# Patient Record
Sex: FEMALE | Race: WHITE | Employment: PART TIME | URBAN - METROPOLITAN AREA
[De-identification: names, ages, dates, MRNs, and addresses within clinical notes are randomized per-mention and may not be internally consistent; named-entity substitution may affect disease eponyms.]

---

## 2017-11-15 ENCOUNTER — APPOINTMENT (OUTPATIENT)
Dept: GENERAL RADIOLOGY | Facility: CLINIC | Age: 82
DRG: 190 | End: 2017-11-15
Attending: EMERGENCY MEDICINE
Payer: MEDICARE

## 2017-11-15 ENCOUNTER — HOSPITAL ENCOUNTER (INPATIENT)
Facility: CLINIC | Age: 82
LOS: 5 days | Discharge: HOME | DRG: 190 | End: 2017-11-20
Attending: EMERGENCY MEDICINE | Admitting: INTERNAL MEDICINE
Payer: MEDICARE

## 2017-11-15 DIAGNOSIS — J18.9 PNEUMONIA OF LEFT LOWER LOBE DUE TO INFECTIOUS ORGANISM: ICD-10-CM

## 2017-11-15 DIAGNOSIS — J96.21 ACUTE ON CHRONIC RESPIRATORY FAILURE WITH HYPOXIA AND HYPERCAPNIA (H): ICD-10-CM

## 2017-11-15 DIAGNOSIS — J44.1 COPD EXACERBATION (H): ICD-10-CM

## 2017-11-15 DIAGNOSIS — Z98.890 H/O MITRAL VALVE REPAIR: Primary | ICD-10-CM

## 2017-11-15 DIAGNOSIS — J96.22 ACUTE ON CHRONIC RESPIRATORY FAILURE WITH HYPOXIA AND HYPERCAPNIA (H): ICD-10-CM

## 2017-11-15 LAB
ALBUMIN SERPL-MCNC: 3.8 G/DL (ref 3.4–5)
ALP SERPL-CCNC: 72 U/L (ref 40–150)
ALT SERPL W P-5'-P-CCNC: 15 U/L (ref 0–50)
ANION GAP SERPL CALCULATED.3IONS-SCNC: 5 MMOL/L (ref 3–14)
AST SERPL W P-5'-P-CCNC: 24 U/L (ref 0–45)
BASE EXCESS BLDV CALC-SCNC: 11 MMOL/L
BASOPHILS # BLD AUTO: 0 10E9/L (ref 0–0.2)
BASOPHILS NFR BLD AUTO: 0.2 %
BILIRUB SERPL-MCNC: 0.6 MG/DL (ref 0.2–1.3)
BUN SERPL-MCNC: 31 MG/DL (ref 7–30)
CALCIUM SERPL-MCNC: 9.6 MG/DL (ref 8.5–10.1)
CHLORIDE SERPL-SCNC: 99 MMOL/L (ref 94–109)
CO2 SERPL-SCNC: 37 MMOL/L (ref 20–32)
CREAT SERPL-MCNC: 1.17 MG/DL (ref 0.52–1.04)
DIFFERENTIAL METHOD BLD: ABNORMAL
EOSINOPHIL # BLD AUTO: 0 10E9/L (ref 0–0.7)
EOSINOPHIL NFR BLD AUTO: 0.1 %
ERYTHROCYTE [DISTWIDTH] IN BLOOD BY AUTOMATED COUNT: 15.8 % (ref 10–15)
GFR SERPL CREATININE-BSD FRML MDRD: 44 ML/MIN/1.7M2
GLUCOSE SERPL-MCNC: 160 MG/DL (ref 70–99)
HCO3 BLDV-SCNC: 38 MMOL/L (ref 21–28)
HCT VFR BLD AUTO: 37.4 % (ref 35–47)
HGB BLD-MCNC: 10.1 G/DL (ref 11.7–15.7)
HGB BLD-MCNC: 11.9 G/DL (ref 11.7–15.7)
IMM GRANULOCYTES # BLD: 0 10E9/L (ref 0–0.4)
IMM GRANULOCYTES NFR BLD: 0.3 %
INR PPP: 3.63 (ref 0.86–1.14)
INTERPRETATION ECG - MUSE: NORMAL
LACTATE BLD-SCNC: 0.9 MMOL/L (ref 0.7–2)
LYMPHOCYTES # BLD AUTO: 0.5 10E9/L (ref 0.8–5.3)
LYMPHOCYTES NFR BLD AUTO: 5.4 %
MCH RBC QN AUTO: 31.8 PG (ref 26.5–33)
MCHC RBC AUTO-ENTMCNC: 31.8 G/DL (ref 31.5–36.5)
MCV RBC AUTO: 100 FL (ref 78–100)
MONOCYTES # BLD AUTO: 0.7 10E9/L (ref 0–1.3)
MONOCYTES NFR BLD AUTO: 7 %
NEUTROPHILS # BLD AUTO: 8.6 10E9/L (ref 1.6–8.3)
NEUTROPHILS NFR BLD AUTO: 87 %
NRBC # BLD AUTO: 0 10*3/UL
NRBC BLD AUTO-RTO: 0 /100
OXYHGB MFR BLDV: 67 %
PCO2 BLDV: 65 MM HG (ref 40–50)
PH BLDV: 7.38 PH (ref 7.32–7.43)
PLATELET # BLD AUTO: 186 10E9/L (ref 150–450)
PO2 BLDV: 38 MM HG (ref 25–47)
POTASSIUM SERPL-SCNC: 4.6 MMOL/L (ref 3.4–5.3)
PROT SERPL-MCNC: 8.1 G/DL (ref 6.8–8.8)
RBC # BLD AUTO: 3.74 10E12/L (ref 3.8–5.2)
SODIUM SERPL-SCNC: 141 MMOL/L (ref 133–144)
WBC # BLD AUTO: 9.9 10E9/L (ref 4–11)

## 2017-11-15 PROCEDURE — 94640 AIRWAY INHALATION TREATMENT: CPT

## 2017-11-15 PROCEDURE — 99285 EMERGENCY DEPT VISIT HI MDM: CPT | Mod: 25

## 2017-11-15 PROCEDURE — 99207 ZZC CDG-MDM COMPONENT: MEETS MODERATE - UP CODED: CPT | Performed by: INTERNAL MEDICINE

## 2017-11-15 PROCEDURE — 93005 ELECTROCARDIOGRAM TRACING: CPT

## 2017-11-15 PROCEDURE — 83605 ASSAY OF LACTIC ACID: CPT | Performed by: EMERGENCY MEDICINE

## 2017-11-15 PROCEDURE — 21000001 ZZH R&B HEART CARE

## 2017-11-15 PROCEDURE — 80053 COMPREHEN METABOLIC PANEL: CPT | Performed by: EMERGENCY MEDICINE

## 2017-11-15 PROCEDURE — 25000132 ZZH RX MED GY IP 250 OP 250 PS 637: Mod: GY | Performed by: INTERNAL MEDICINE

## 2017-11-15 PROCEDURE — 82805 BLOOD GASES W/O2 SATURATION: CPT | Performed by: EMERGENCY MEDICINE

## 2017-11-15 PROCEDURE — 99223 1ST HOSP IP/OBS HIGH 75: CPT | Mod: AI | Performed by: INTERNAL MEDICINE

## 2017-11-15 PROCEDURE — 85025 COMPLETE CBC W/AUTO DIFF WBC: CPT | Performed by: EMERGENCY MEDICINE

## 2017-11-15 PROCEDURE — 40000275 ZZH STATISTIC RCP TIME EA 10 MIN

## 2017-11-15 PROCEDURE — A9270 NON-COVERED ITEM OR SERVICE: HCPCS | Mod: GY | Performed by: INTERNAL MEDICINE

## 2017-11-15 PROCEDURE — 85610 PROTHROMBIN TIME: CPT | Performed by: INTERNAL MEDICINE

## 2017-11-15 PROCEDURE — 36415 COLL VENOUS BLD VENIPUNCTURE: CPT | Performed by: INTERNAL MEDICINE

## 2017-11-15 PROCEDURE — 85018 HEMOGLOBIN: CPT | Performed by: INTERNAL MEDICINE

## 2017-11-15 PROCEDURE — 94640 AIRWAY INHALATION TREATMENT: CPT | Mod: 76

## 2017-11-15 PROCEDURE — 25000125 ZZHC RX 250: Performed by: INTERNAL MEDICINE

## 2017-11-15 PROCEDURE — 71020 XR CHEST 2 VW: CPT

## 2017-11-15 RX ORDER — LIDOCAINE 40 MG/G
CREAM TOPICAL
Status: DISCONTINUED | OUTPATIENT
Start: 2017-11-15 | End: 2017-11-15

## 2017-11-15 RX ORDER — FUROSEMIDE 40 MG
80 TABLET ORAL
COMMUNITY
Start: 2017-08-10

## 2017-11-15 RX ORDER — METHYLPREDNISOLONE SODIUM SUCCINATE 125 MG/2ML
INJECTION, POWDER, LYOPHILIZED, FOR SOLUTION INTRAMUSCULAR; INTRAVENOUS
Status: DISCONTINUED
Start: 2017-11-15 | End: 2017-11-15 | Stop reason: HOSPADM

## 2017-11-15 RX ORDER — IPRATROPIUM BROMIDE AND ALBUTEROL SULFATE 2.5; .5 MG/3ML; MG/3ML
SOLUTION RESPIRATORY (INHALATION)
Status: DISCONTINUED
Start: 2017-11-15 | End: 2017-11-15 | Stop reason: HOSPADM

## 2017-11-15 RX ORDER — LIDOCAINE 40 MG/G
CREAM TOPICAL
Status: DISCONTINUED | OUTPATIENT
Start: 2017-11-15 | End: 2017-11-20 | Stop reason: HOSPADM

## 2017-11-15 RX ORDER — IPRATROPIUM BROMIDE AND ALBUTEROL SULFATE 2.5; .5 MG/3ML; MG/3ML
3 SOLUTION RESPIRATORY (INHALATION) EVERY 4 HOURS
Status: DISCONTINUED | OUTPATIENT
Start: 2017-11-15 | End: 2017-11-20 | Stop reason: HOSPADM

## 2017-11-15 RX ORDER — WARFARIN SODIUM 2 MG/1
TABLET ORAL
Status: ON HOLD | COMMUNITY
Start: 2017-08-29 | End: 2017-11-15

## 2017-11-15 RX ORDER — ACETAMINOPHEN 325 MG/1
650 TABLET ORAL EVERY 4 HOURS PRN
Status: DISCONTINUED | OUTPATIENT
Start: 2017-11-15 | End: 2017-11-20 | Stop reason: HOSPADM

## 2017-11-15 RX ORDER — AZITHROMYCIN 250 MG/1
TABLET, FILM COATED ORAL
Status: DISCONTINUED
Start: 2017-11-15 | End: 2017-11-15 | Stop reason: HOSPADM

## 2017-11-15 RX ORDER — FUROSEMIDE 40 MG
80 TABLET ORAL DAILY
Status: DISCONTINUED | OUTPATIENT
Start: 2017-11-16 | End: 2017-11-20 | Stop reason: HOSPADM

## 2017-11-15 RX ORDER — PREDNISONE 20 MG/1
40 TABLET ORAL DAILY
Status: DISCONTINUED | OUTPATIENT
Start: 2017-11-15 | End: 2017-11-18

## 2017-11-15 RX ORDER — AZITHROMYCIN 250 MG/1
250 TABLET, FILM COATED ORAL DAILY
Status: COMPLETED | OUTPATIENT
Start: 2017-11-16 | End: 2017-11-19

## 2017-11-15 RX ORDER — NALOXONE HYDROCHLORIDE 0.4 MG/ML
.1-.4 INJECTION, SOLUTION INTRAMUSCULAR; INTRAVENOUS; SUBCUTANEOUS
Status: DISCONTINUED | OUTPATIENT
Start: 2017-11-15 | End: 2017-11-20 | Stop reason: HOSPADM

## 2017-11-15 RX ORDER — PANTOPRAZOLE SODIUM 40 MG/1
40 TABLET, DELAYED RELEASE ORAL DAILY
Status: DISCONTINUED | OUTPATIENT
Start: 2017-11-16 | End: 2017-11-20 | Stop reason: HOSPADM

## 2017-11-15 RX ORDER — POTASSIUM CHLORIDE 750 MG/1
20 CAPSULE, EXTENDED RELEASE ORAL DAILY
COMMUNITY
Start: 2017-04-14

## 2017-11-15 RX ADMIN — PREDNISONE 40 MG: 20 TABLET ORAL at 11:14

## 2017-11-15 RX ADMIN — IPRATROPIUM BROMIDE AND ALBUTEROL SULFATE 3 ML: .5; 3 SOLUTION RESPIRATORY (INHALATION) at 10:24

## 2017-11-15 RX ADMIN — IPRATROPIUM BROMIDE AND ALBUTEROL SULFATE 3 ML: .5; 3 SOLUTION RESPIRATORY (INHALATION) at 23:37

## 2017-11-15 RX ADMIN — METOPROLOL TARTRATE 75 MG: 50 TABLET ORAL at 20:41

## 2017-11-15 RX ADMIN — IPRATROPIUM BROMIDE AND ALBUTEROL SULFATE 3 ML: .5; 3 SOLUTION RESPIRATORY (INHALATION) at 17:13

## 2017-11-15 RX ADMIN — IPRATROPIUM BROMIDE AND ALBUTEROL SULFATE 3 ML: .5; 3 SOLUTION RESPIRATORY (INHALATION) at 19:08

## 2017-11-15 ASSESSMENT — ENCOUNTER SYMPTOMS
MYALGIAS: 0
SHORTNESS OF BREATH: 1
ARTHRALGIAS: 0

## 2017-11-15 NOTE — ED NOTES
Bed: ED01  Expected date: 11/15/17  Expected time:   Means of arrival:   Comments:  533 87 f/chest congestion

## 2017-11-15 NOTE — ED NOTES
[facility]  ED Nurse Handoff Report    ED Chief complaint: Shortness of Breath (history of COPD, on 4 liters NC. Congested since yesterday. )      ED Diagnosis:   Final diagnoses:   Acute on chronic respiratory failure with hypoxia and hypercapnia (H)   Pneumonia of left lower lobe due to infectious organism (H)   COPD exacerbation (H)       Code Status: DNR / DNI    Allergies:   Allergies   Allergen Reactions     Codeine Sulfate Nausea       Activity level - Baseline/Home:  Independent    Activity Level - Current:   Stand with Assist     Needed?: No    Isolation: No  Infection: Not Applicable    Bariatric?: No    Vital Signs:   Vitals:    11/15/17 0415 11/15/17 0430 11/15/17 0445 11/15/17 0500   BP: 131/67 123/55 110/68 105/56   Pulse:       Resp: 21 26 22 25   Temp:       TempSrc:       SpO2: 93% 94% 94% 94%   Weight:       Height:          *  Cardiac Rhythm: ,        Pain level:      Is this patient confused?: No    Patient Report: Initial Complaint: Patient presented with acute onset of shortness of breath that started this evening.  Patient states she hasn't been around anyone who is ill, patient denies fevers/chills.  Patient states this evening she started feeling congestion in her lungs and work of breathing increased with any exertion.  Patient denies pain in chest. Patient states she had one bowel movement that was loose before coming in.  Patient was treated for lyme disease after tick was removed from ear a couple of weeks ago.  Patient has hx of lung ca, Right lower lobe lobectomy, CHF and COPD.    Focused Assessment: Patient is alert and oriented x4, work of breathing increased with retractions and 2-3 word dyspnea on arrival.  Breath sounds initially were diffuse course crackles with expiratory wheezes on right. Patient received duoneb x 2, 125mg Solumedrol and lung sounds are crackles on left side predominantly with fine crackles on right.    Tests Performed: Chest xray,  labs  Abnormal Results: Results for JESSIE EASTMAN (MRN [id number]) as of 11/15/2017 05:11   Ref. Range 11/15/2017 01:33 11/15/2017 01:54   Sodium Latest Ref Range: 133 - 144 mmol/L 141    Potassium Latest Ref Range: 3.4 - 5.3 mmol/L 4.6    Chloride Latest Ref Range: 94 - 109 mmol/L 99    Carbon Dioxide Latest Ref Range: 20 - 32 mmol/L 37 (H)    Urea Nitrogen Latest Ref Range: 7 - 30 mg/dL 31 (H)    Creatinine Latest Ref Range: 0.52 - 1.04 mg/dL 1.17 (H)    GFR Estimate Latest Ref Range: >60 mL/min/1.7m2 44 (L)    GFR Estimate If Black Latest Ref Range: >60 mL/min/1.7m2 53 (L)    Calcium Latest Ref Range: 8.5 - 10.1 mg/dL 9.6    Anion Gap Latest Ref Range: 3 - 14 mmol/L 5    Albumin Latest Ref Range: 3.4 - 5.0 g/dL 3.8    Protein Total Latest Ref Range: 6.8 - 8.8 g/dL 8.1    Bilirubin Total Latest Ref Range: 0.2 - 1.3 mg/dL 0.6    Alkaline Phosphatase Latest Ref Range: 40 - 150 U/L 72    ALT Latest Ref Range: 0 - 50 U/L 15    AST Latest Ref Range: 0 - 45 U/L 24    Lactic Acid Latest Ref Range: 0.7 - 2.0 mmol/L 0.9    Glucose Latest Ref Range: 70 - 99 mg/dL 160 (H)    Ph Venous Latest Ref Range: 7.32 - 7.43 pH  7.38   PCO2 Venous Latest Ref Range: 40 - 50 mm Hg  65 (H)   PO2 Venous Latest Ref Range: 25 - 47 mm Hg  38   Bicarbonate Venous Latest Ref Range: 21 - 28 mmol/L  38 (H)   Base Excess Venous Latest Units: mmol/L  11.0   Oxyhemoglobin Venous Latest Units: %  67   WBC Latest Ref Range: 4.0 - 11.0 10e9/L 9.9    Hemoglobin Latest Ref Range: 11.7 - 15.7 g/dL 11.9    Hematocrit Latest Ref Range: 35.0 - 47.0 % 37.4    Platelet Count Latest Ref Range: 150 - 450 10e9/L 186    RBC Count Latest Ref Range: 3.8 - 5.2 10e12/L 3.74 (L)    MCV Latest Ref Range: 78 - 100 fl 100    MCH Latest Ref Range: 26.5 - 33.0 pg 31.8    MCHC Latest Ref Range: 31.5 - 36.5 g/dL 31.8    RDW Latest Ref Range: 10.0 - 15.0 % 15.8 (H)    Diff Method Unknown Automated Method    % Neutrophils Latest Units: % 87.0    % Lymphocytes Latest  Units: % 5.4    % Monocytes Latest Units: % 7.0    % Eosinophils Latest Units: % 0.1    % Basophils Latest Units: % 0.2    % Immature Granulocytes Latest Units: % 0.3    Nucleated RBCs Latest Ref Range: 0 /100 0    Absolute Neutrophil Latest Ref Range: 1.6 - 8.3 10e9/L 8.6 (H)    Absolute Lymphocytes Latest Ref Range: 0.8 - 5.3 10e9/L 0.5 (L)    Absolute Monocytes Latest Ref Range: 0.0 - 1.3 10e9/L 0.7    Absolute Eosinophils Latest Ref Range: 0.0 - 0.7 10e9/L 0.0    Absolute Basophils Latest Ref Range: 0.0 - 0.2 10e9/L 0.0    Abs Immature Granulocytes Latest Ref Range: 0 - 0.4 10e9/L 0.0    Absolute Nucleated RBC Unknown 0.0      Treatments provided: Duoneb x 2- Improved work of breathing, decreased respiratory rate, improved breath sounds  125mg Solumedrol, 500mg Oral Zithromax, 1G Rocephin IV.     Family Comments: At bedside    OBS brochure/video discussed/provided to patient: No    ED Medications:   Medications   sodium chloride (PF) 0.9% PF flush 3 mL (not administered)   lidocaine 1 % 1 mL (not administered)   lidocaine (LMX4) cream (not administered)   sodium chloride (PF) 0.9% PF flush 3 mL (not administered)   0.9% sodium chloride BOLUS (1,000 mLs Intravenous Not Given 11/15/17 8728)   ipratropium - albuterol 0.5 mg/2.5 mg/3 mL (DUONEB) 0.5-2.5 (3) MG/3ML neb solution (not administered)   methylPREDNISolone sodium succinate (solu-MEDROL) 125 mg/2 mL injection (not administered)   ipratropium - albuterol 0.5 mg/2.5 mg/3 mL (DUONEB) 0.5-2.5 (3) MG/3ML neb solution (not administered)   azithromycin (ZITHROMAX) 250 MG tablet (not administered)   cefTRIAXone (ROCEPHIN) 1-3.74 GM-% intermittent infusion (not administered)       Drips infusing?:  No      ED NURSE PHONE NUMBER: [phone]

## 2017-11-15 NOTE — PHARMACY-ADMISSION MEDICATION HISTORY
Admission medication history interview status for the 11/15/2017  admission is complete. See EPIC admission navigator for prior to admission medications     Medication history source reliability:Good    Actions taken by pharmacist (provider contacted, etc):None     Additional medication history information not noted on PTA med list :None    Medication reconciliation/reorder completed by provider prior to medication history? Yes    Time spent in this activity: 15 min    Prior to Admission medications    Medication Sig Last Dose Taking? Auth Provider   furosemide (LASIX) 40 MG tablet Take 80 mg by mouth  Yes Reported, Patient   potassium chloride (MICRO-K) 10 MEQ CR capsule Take 20 mEq by mouth daily  11/14/2017 at Unknown time Yes Reported, Patient   WARFARIN SODIUM PO Take 4 mg by mouth daily 11/13/2017 Yes Unknown, Entered By History   VITAMIN D, CHOLECALCIFEROL, PO Take 2,000 Units by mouth daily 11/14/2017 at Unknown time Yes Unknown, Entered By History   pantoprazole (PROTONIX) 40 MG enteric coated tablet Take 1 tablet (40 mg) by mouth 2 times daily (before meals)  Patient taking differently: Take 40 mg by mouth daily  11/14/2017 at Unknown time Yes Roger Riley, DO   fluticasone-salmeterol (ADVAIR) 250-50 MCG/DOSE diskus inhaler Inhale 1 puff into the lungs every 12 hours 11/14/2017 at Unknown time Yes Reported, Patient   METOPROLOL TARTRATE PO Take 75 mg by mouth 2 times daily  11/14/2017 at Unknown time Yes Reported, Patient

## 2017-11-15 NOTE — ED PROVIDER NOTES
"  History     Chief Complaint:  Shortness of breath     HPI   Neda Dennis is a 87 year old female on Warfarin with a history of COPD, Lung cancer, and CHF with a placed pacemaker who presents with shortness of breath. The patient states that her lungs are \"full of gunk\", but that she has not been able to get rid of it with coughing. The patient denies chest pain, arthralgia or myalgia, and swelling in her legs. The last time her breathing was bad like this was the last time she had pneumonia, which was a year and a half ago. Over the course of the day her dyspnea has increased. Pt normally wears 4L NC at home for her COPD, however EMS found her to be hypoxic despite this. She denies recent fevers or URI symptoms. Denies abd pain, n/v.    Allergies:  Codeine Sulfate      Medications:    Lasix  Coumadin  Protonix  Advair  Metoprolol Tartrate     Past Medical History:    COPD  Hypertension  Lung cancer  Pacemaker   S/P MVR   Ulcer  GI Bleed  Mitral valve repair  Atrial Fibrillation  Pleural effusion  Lung nodules  Shortness of breath   Schamberg disease     Past Surgical History:    EGD Combined  Lobectomy of lung     Family History:    History reviewed. No pertinent family history.      Social History:  The patient was accompanied to the ED by EMS.  Smoking Status: Former  Alcohol Use: No   Marital Status:       Review of Systems   Respiratory: Positive for shortness of breath.    Cardiovascular: Negative for chest pain and leg swelling.   Musculoskeletal: Negative for arthralgias and myalgias.   All other systems reviewed and are negative.    Physical Exam   First Vitals:  BP: 160/82  Pulse: 102  Temp: 99.3  F (37.4  C)  Resp: 30  Height: 177.8 cm (5' 10\")  Weight: 61.2 kg (135 lb)  SpO2: 94 %      Physical Exam  General: Appears mildly uncomfortable. Nontoxic.  Head:  Scalp, face, and head appear normal  Eyes:  Pupils are equal, round, and reactive to light    No nystagmus    Conjunctivae " non-injected and sclerae white  ENT:    The external nose is normal    Pinnae are normal    The oropharynx is normal, mucous membranes moist    Uvula is in the midline  Neck:  Normal range of motion    There is no rigidity noted    Trachea is in the midline  CV:  Tachycardic rate, regular rhythm      Normal S1/S2, no S3/S4    No murmur or rub  Resp:  Lungs are equal bilaterally    Diffuse coarse rales bilaterally to mid lung fields with diffusely restricted air movement.    + tachypnea    Moderately increased WOB with retractions  GI:  Abdomen is soft, no rigidity or guarding    No distension, or mass    No tenderness   MS:  Normal muscular tone    Symmetric motor strength    No lower extremity edema  Skin:  No rash or acute skin lesions noted  Neuro:  Awake and alert    Speech is normal and fluent    Moves all extremities spontaneously  Psych: Normal affect.  Appropriate interactions.      Emergency Department Course     ECG:  Indication: Shortness of breath   Completed at 0504.  Read at 0507.   Ventricular paced rhythm with occasional premature ventriclar complexes   Abnormal ECG   Rate 65  bpm. NE interval *. QRS duration 188. QT/QTc 486/505. P-R-T axes * 107 85.    Imaging:  Radiology findings were communicated with the patient and family who voiced understanding of the findings.    XR Chest 2 views:   IMPRESSION:  1. Left basilar infiltrate, atelectasis versus pneumonia.  2. Cardiomegaly.  Report per radiology    Laboratory:  Laboratory findings were communicated with the patient and family who voiced understanding of the findings.  Blood gas venous: pH 7.38, pCO2 65 (H), pO2 38, Bicarbonate 38 (H), Oxyhemoglobin 67, Base excess 11.0  CBC: AWNL. (WBC 9.9, HGB 11.9, )  Lactic acid whole blood: 0.9  CMP: CO2 37 high, Glucose 160 high, Urea nitrogen 31 high, Creatinine 1.17 high, GFR Estimate 44 low o/w WNL     Interventions:  0213 - DuoNeb 3mL Nebulization x3  0300 - solu-medrol 125 mg IV   0337 -  Ceftriaxone 1g IV   0337 - Azithromycin 300 mg PO     Emergency Department Course:  Nursing notes and vitals reviewed.  EKG obtained in the ED, see results above.   The patient was sent for a XR Chest 2 views while in the emergency department, results above.   The patient provided a urine sample here in the emergency department. This was sent for laboratory testing, findings above.  IV was inserted and blood was drawn for laboratory testing, results above.  0145: I performed an exam of the patient as documented above.   0216: I was updated that the patient became more short of breath on canula.   0257: Patient rechecked and updated.   0342: I spoke with Dr. Ashton of the Hospitalist service regarding the patient s presentation, findings, and plan of care.  Findings and plan explained to the Patient and spouse who consents to admission. Discussed the patient with Dr. Palomo, who will admit the patient [place] a [facility] [place] for further monitoring, evaluation, and treatment.  I personally reviewed the laboratory and imaging results with the Patient and spouse and answered all related questions prior to admission.    Impression & Plan      Medical Decision Making:  Neda Dennis is a 87 year old female with a history of COPD and CHF who presents today with acute worsening of her sob and dyspnea as well as worsening hypoxia. Patient is normally on 4 L nasal cannula at home, but had an increased oxygen requirement today. DDx includes COPD exacerbation, CHF exacerbation, pneumonia, pneumothorax, pleural effusion, pulmonary embolism, acute coronary syndrome. Clinically the patient does not appear to be volume overloaded. She has no JVD, there is no peripheral edema, I feel that this makes CHF less likely. She is afebrile, but does note increasing cough which would be concerning for possible COPD exacerbation or pneumonia. Patient was treated with nebs and steroids with improvement in her work of breathing and tachypnea  although she did have persistently increased oxygen requirement. Chest x-ray revealed a possible left lower lobe infiltrate, otherwise was unchanged. Given this, Patient was started on treatment for community acquired pneumonia with ceftriaxone and azithromycin. IV fluids were held given her history of CHF. Patient will be admitted to the hospitalist or further evaluation and treatment. Patient improved while in the emergency department and remained stable at the time of her admission.     Diagnosis:    ICD-10-CM    1. Acute on chronic respiratory failure with hypoxia and hypercapnia (H) J96.21     J96.22    2. Pneumonia of left lower lobe due to infectious organism (H) J18.1    3. COPD exacerbation (H) J44.1        Disposition:  [place] to [facility]    Scribe Disclosure:  Emma DRUMMOND, am serving as a scribe at 1:45 AM on 11/15/2017 to document services personally performed by Eladio Aquino MD based on my observations and the provider's statements to me.   11/15/2017    EMERGENCY DEPARTMENT       Eladio Aquino MD  11/15/17 2008

## 2017-11-15 NOTE — IP AVS SNAPSHOT
[address], [address]    [facility] [address]    Phone:  [phone]                                       After Visit Summary   11/15/2017    Neda Dennis    MRN: [id number]           After Visit Summary Signature Page     I have received my discharge instructions, and my questions have been answered. I have discussed any challenges I see with this plan with the nurse or doctor.    ..........................................................................................................................................  Patient/Patient Representative Signature      ..........................................................................................................................................  Patient Representative Print Name and Relationship to Patient    ..................................................               ................................................  Date                                            Time    ..........................................................................................................................................  Reviewed by Signature/Title    ...................................................              ..............................................  Date                                                            Time

## 2017-11-15 NOTE — H&P
[facility]    History and Physical  Hospitalist       Date of Admission:  11/15/2017  Date of Service (when I saw the patient): 11/15/17    Assessment & Plan   Neda Dennis is a 87 year old female who presents with shortness of breath    COPD exacerbation  Pneumonia  Mrs. Dennis presented to the hospital today after having acute onset of shortness of breath.  She states she was in her normal state of health until 10 PM when she is developed acute shortness of breath.  She presented to the emergency department quite short of breath but responded well to nebulization.  At the time of my visit she was on 6 L of oxygen, which is slightly over her baseline oxygen dose.  Chest x-ray was consistent with a left lower lobe infiltrate.  - Continue treatment with ceftriaxone and azithromycin for pneumonia  - DuoNeb's q.4 hours while awake  - We'll put on short steroid course  - Continue prior to admission Advair inhaler    History of lung cancer  Patient states she's had surgery as well as radiation therapy.  States she still is a cancer but is slow growing.  Further plans for treatment.    History of mitral valve replacement  History of atrial fibrillation with pacemaker placement  Hypertension  - We'll place a warfarin consult pharmacy  - Continue Lasix and metoprolol.    DVT Prophylaxis: Warfarin  Code Status: DNR/ DNI    Disposition: Expected discharge in couple of days once her breathing is improved.    Horacio Ashton MD  [phone] (P)  Text Page     Primary Care Physician   Dr. Pollo Leiva (Inactive)    Chief Complaint   Shortness of breath    History is obtained from the patient and medical records    History of Present Illness   Neda Dennis is a 87 year old female who presents with acute onset of shortness of breath.  His affect was in her normal state of health until 10 PM the evening prior to admission when she developed sudden onset of shortness of breath.  Denies  any chest pain fevers chills or sweats.  On arrival to the emergency department she was quite dyspneic and had multiple nebs.  And she responded quite well and her oxygen needs decreased.  She may visit she is actually doing well.  Her breathing was nonlabored.  She denied any chest pain, nausea or vomiting, fevers or chills.  She states prior to last night she was doing fine.    Past Medical History    I have reviewed this patient's medical history and updated it with pertinent information if needed.   Past Medical History:   Diagnosis Date     COPD (chronic obstructive pulmonary disease) (H)      HTN (hypertension)      Lung cancer (H)      Lung cancer (H)      Pacemaker      S/P MVR (mitral valve repair)      Ulcer (H)        Past Surgical History   I have reviewed this patient's surgical history and updated it with pertinent information if needed.  Past Surgical History:   Procedure Laterality Date     ESOPHAGOSCOPY, GASTROSCOPY, DUODENOSCOPY (EGD), COMBINED  9/4/2013    Procedure: COMBINED ESOPHAGOSCOPY, GASTROSCOPY, DUODENOSCOPY (EGD), BIOPSY SINGLE OR MULTIPLE;  gastroscopy;  Surgeon: Sharan Vazquez MD;  Location: [facility]     LOBECTOMY LUNG         Prior to Admission Medications   Prior to Admission Medications   Prescriptions Last Dose Informant Patient Reported? Taking?   METOPROLOL TARTRATE PO  Self Yes No   Sig: Take 75 mg by mouth 2 times daily    Omega-3 Fatty Acids (OMEGA-3 FISH OIL PO)  Self Yes No   Sig: Take 1 g by mouth daily    WARFARIN SODIUM PO  Self Yes No   Sig: Take 4 mg by mouth See Admin Instructions Pt has been taking 4 mg po qPM for past month.   fluticasone-salmeterol (ADVAIR) 250-50 MCG/DOSE diskus inhaler  Self Yes No   Sig: Inhale 1 puff into the lungs every 12 hours   furosemide (LASIX) 40 MG tablet   Yes Yes   Sig: Take 80 mg by mouth   multivitamin, therapeutic with minerals (MULTI-VITAMIN) TABS  Self Yes No   Sig: Take 1 tablet by mouth daily   pantoprazole (PROTONIX) 40 MG  enteric coated tablet  Self No No   Sig: Take 1 tablet (40 mg) by mouth 2 times daily (before meals)   potassium chloride (MICRO-K) 10 MEQ CR capsule   Yes Yes   Sig: Take 20 mEq by mouth   triamterene-hydrochlorothiazide (MAXZIDE-25) 37.5-25 MG per tablet  Self Yes No   Sig: Take 1 tablet by mouth daily   warfarin (COUMADIN) 2 MG tablet   Yes Yes   Sig: Warfarin 5 mg 8/29/17, then 4 mg daily after that. Or take as directed.      Facility-Administered Medications: None     Allergies   Allergies   Allergen Reactions     Codeine Sulfate Nausea       Social History   I have reviewed this patient's social history and updated it with pertinent information if needed. Neda Dennis  reports that she has quit smoking. She does not have any smokeless tobacco history on file. She reports that she does not drink alcohol or use illicit drugs.    Family History   I have reviewed this patient's family history and updated it with pertinent information if needed.   No family history on file.    Review of Systems   The 10 point Review of Systems is negative other than noted in the HPI or here.    Physical Exam   Temp: 99.3  F (37.4  C) Temp src: Oral BP: 107/54 Pulse: 102 Heart Rate: 71 Resp: 22 SpO2: 92 % O2 Device: Nasal cannula Oxygen Delivery: 8 LPM  Vital Signs with Ranges  136 lbs 12.8 oz    Constitutional: alert, oriented and in no acute distress  Eyes: EOMI, PERRL  HEENT: OP clear  Respiratory: coarse but clear to some degree with cough. LLL coarse BS.   Cardiovascular: RRR without m/r/g  GI: soft, nontender, nondistended, no HSM  Lymph/Hematologic: no cervical LAD  Genitourinary: deferred  Skin: no rashes or lesions grossly  Musculoskeletal: no deformities or arthritis  Neurologic: CN II-XII, MARI, sensation grossly intact  Psychiatric: mood and affect wnl    Data   Data reviewed today:  I personally reviewed the chest x-ray image(s) showing Left basal infiltrate.    Recent Labs  Lab 11/15/17  0133   WBC 9.9   HGB  11.9            POTASSIUM 4.6   CHLORIDE 99   CO2 37*   BUN 31*   CR 1.17*   ANIONGAP 5   JENNIE 9.6   *   ALBUMIN 3.8   PROTTOTAL 8.1   BILITOTAL 0.6   ALKPHOS 72   ALT 15   AST 24       Recent Results (from the past 24 hour(s))   XR Chest 2 Views    Narrative    XR CHEST 2 VW  11/15/2017 1:57 AM      HISTORY: Cough, COPD, CHF, congested lungs.      COMPARISON: 5/3/2014.    FINDINGS: Sternal wires and a left subclavian cardiac device are in  place. No pneumothorax. The heart is enlarged without pulmonary edema.  The thoracic aorta is calcified and tortuous. There is new left  basilar infiltrate. Curvilinear scarring in the right midlung.  Probable scarring at the right lung base. There may be a left pleural  effusion.      Impression    IMPRESSION:  1. Left basilar infiltrate, atelectasis versus pneumonia.  2. Cardiomegaly.    GULSHAN REYEZ MD

## 2017-11-15 NOTE — DOWNTIME EVENT NOTE
The EMR was down for 2.75 hours on 11/15/2017.    Erin Ozuna was responsible for completing the paper charting during this time period.     The following information was re-entered into the system by Erin Ozuna: Height/weight, Allergies, Problem list and Flowsheet data    The following information will remain in the paper chart: Narrative Summary and MAR    Erin Ozuna  11/15/2017

## 2017-11-15 NOTE — PLAN OF CARE
Problem: Pneumonia (Adult)  Goal: Signs and Symptoms of Listed Potential Problems Will be Absent, Minimized or Managed (Pneumonia)  Signs and symptoms of listed potential problems will be absent, minimized or managed by discharge/transition of care (reference Pneumonia (Adult) CPG).   Outcome: No Change  Oxygen weaned down to 6 liters oxymizer (4L at baseline), Productive cough, mostly bloody, crackles present on the left, otherwise diminished with faint wheezes. Pulmonary toilet encouraged.

## 2017-11-15 NOTE — PROGRESS NOTES
Pt transferred from ED to [facility] [place] 260-2. Accompanied by daughter and son-in-law. Pt resting comfortably pts VSS on 8L NC.

## 2017-11-15 NOTE — IP AVS SNAPSHOT
MRN:[id number]                      After Visit Summary   11/15/2017    Neda Dennis    MRN: [id number]           Thank you!     Thank you for choosing [facility] for your care. Our goal is always to provide you with excellent care. Hearing back from our patients is one way we can continue to improve our services. Please take a few minutes to complete the written survey that you may receive in the mail after you visit with us. Thank you!        Patient Information     Date Of Birth          12/1/1929        Designated Caregiver       Most Recent Value    Caregiver    Will someone help with your care after discharge? yes    Name of designated caregiver Lisa [daughter]    Phone number of caregiver [phone]    Caregiver address [facility]      About your hospital stay     You were admitted on:  November 15, 2017 You last received care in the:  [place] [address] [facility]    You were discharged on:  November 20, 2017        Reason for your hospital stay       Admitted with worsening shortness of breath, most likely COPD exacerbation but also was treated for pneumonia                  Who to Call     For medical emergencies, please call 911.  For non-urgent questions about your medical care, please call your primary care provider or clinic, [phone]          Attending Provider     Provider Specialty    Eladio Aquino MD Emergency Medicine    Poli, Horacio Shafer MD Internal Medicine    [facility]       Primary Care Provider Office Phone # Fax #    Pollo WILBERTO MD [place] [phone]      After Care Instructions     Diet       Follow this diet upon discharge: Orders Placed This Encounter      Regular Diet Adult            Oxygen Adult       Renew Home Oxygen Order  Renew previous prescription.  Expected treatment length is indefinite (99 months).    Attending Provider: Miriam Martines  Physician signature: See electronic signature  associated with these discharge orders  Date of Order: November 20, 2017                  Follow-up Appointments     Follow-up and recommended labs and tests        Follow up with primary care provider, Pollo Leiva (Inactive), within 7-10  days for hospital follow- up.  The following labs/tests are recommended: CBC.    Follow-up with heart device clinic at [facility] in 2 days as scheduled  -patient to mention that hospital requested her to get an appointment with her cardiologist also to review echo report and also evaluate mild troponin elevation during her hospitalization  Pt to get INR checked this week with her [facility] coumadin clinic  -pt to make appointment with Dr. Arce from oncology to review latest CT chest report  -pt to take her 5 mg of coumadin today before discharge and start her usual dose of 4 mg daily tomorrow, 11/21/2017                  Warfarin Instruction     You have started taking a medicine called warfarin. This is a blood-thinning medicine (anticoagulant). It helps prevent and treat blood clots.      Before leaving the hospital, make sure you know how much to take and how long to take it.      You will need regular blood tests to make sure your blood is clotting safely. It is very important to see your doctor for regular blood tests.    Talk to your doctor before taking any new medicine (this includes over-the-counter drugs and herbal products). Many medicines can interact with warfarin. This may cause more bleeding or too much clotting.     Eating a lot of vitamin K--found in green, leafy vegetables--can change the way warfarin works in your body. Do NOT avoid these foods. Instead, try to eat the same amount each day.     Bleeding is the most common side-effect of warfarin. You may notice bleeding gums, a bloody nose, bruises and bleeding longer when you cut yourself. See a doctor at once if:   o You cough up blood  o You find blood in your stool (poop)  o You have a  "deep cut, or a cut that bleeds longer than 10 minutes   o You have a bad cut, hard fall, accident or hit your head (go to urgent care or the emergency room).    For women who can get pregnant: This medicine can harm an unborn baby. Be very careful not to get pregnant while taking this medicine. If you think you might be pregnant, call your doctor right away.    For more information, read \"Guide to Warfarin Therapy,  the booklet you received in the hospital.        Pending Results     Date and Time Order Name Status Description    2017 0902 EKG 12-lead, tracing only Preliminary             Statement of Approval     Ordered          17 5613  I have reviewed and agree with all the recommendations and orders detailed in this document.  EFFECTIVE NOW     Approved and electronically signed by:  Miriam Martines MD             Admission Information     Date & Time Provider Department Dept. Phone    11/15/2017 Sandra Disla MD [place] [address] [facility] [phone]      Your Vitals Were     Blood Pressure Pulse Temperature Respirations Height Weight    143/70 (BP Location: Right arm) 79 95.8  F (35.4  C) (Oral) 16 1.6 m (5' 3\") 61.8 kg (136 lb 3.2 oz)    Pulse Oximetry BMI (Body Mass Index)                98% 24.13 kg/m2          [email or web address] Information     [employer] lets you send messages to your doctor, view your test results, renew your prescriptions, schedule appointments and more. To sign up, go to [email or web address] . Click on \"Log in\" on the left side of the screen, which will take you to the Welcome page. Then click on \"Sign up Now\" on the right side of the page.     You will be asked to enter the access code listed below, as well as some personal information. Please follow the directions to create your username and password.     Your access code is: [email or web address]  Expires: 2018  4:54 PM     Your access code will  in 90 days. If you need help or a new code, please call your " [facility] or [phone].        Care EveryWhere ID     This is your Care EveryWhere ID. This could be used by other organizations to access your [facility] medical records  [id number]        Equal Access to Services     TRENT FREDERICK : Hadii aad ku hadsteven Raya, waaxda luqadaha, qaybta kaalmada diana, matthieu silva laJanelade hickey. [facility] [phone].    ATENCIÓN: Si habla español, tiene a garcia disposición servicios gratuitos de asistencia lingüística. Llame al [phone].    We comply with applicable federal civil rights laws and [place] laws. We do not discriminate on the basis of race, color, national origin, age, disability, sex, sexual orientation, or gender identity.               Review of your medicines      START taking        Dose / Directions    Ipratropium-Albuterol  MCG/ACT inhaler   Commonly known as:  COMBIVENT RESPIMAT   Used for:  Acute on chronic respiratory failure with hypoxia and hypercapnia (H)        Dose:  1 puff   Inhale 1 puff into the lungs 4 times daily as needed for shortness of breath / dyspnea or wheezing Not to exceed 6 doses per day.   Quantity:  1 Inhaler   Refills:  1         CONTINUE these medicines which may have CHANGED, or have new prescriptions. If we are uncertain of the size of tablets/capsules you have at home, strength may be listed as something that might have changed.        Dose / Directions    pantoprazole 40 MG EC tablet   Commonly known as:  PROTONIX   This may have changed:  when to take this   Used for:  Gastric ulcer        Dose:  40 mg   Take 1 tablet (40 mg) by mouth 2 times daily (before meals)   Quantity:  60 tablet   Refills:  3       warfarin 4 MG tablet   Commonly known as:  COUMADIN   This may have changed:  medication strength   Used for:  H/O mitral valve repair        Dose:  4 mg   Start taking on:  11/21/2017   Take 1 tablet (4 mg) by mouth daily   Refills:  0         CONTINUE these medicines which have NOT CHANGED         Dose / Directions    fluticasone-salmeterol 250-50 MCG/DOSE diskus inhaler   Commonly known as:  ADVAIR        Dose:  1 puff   Inhale 1 puff into the lungs every 12 hours   Refills:  0       furosemide 40 MG tablet   Commonly known as:  LASIX        Dose:  80 mg   Take 80 mg by mouth   Refills:  0       METOPROLOL TARTRATE PO        Dose:  75 mg   Take 75 mg by mouth 2 times daily   Refills:  0       potassium chloride 10 MEQ CR capsule   Commonly known as:  MICRO-K        Dose:  20 mEq   Take 20 mEq by mouth daily   Refills:  0       VITAMIN D (CHOLECALCIFEROL) PO        Dose:  2000 Units   Take 2,000 Units by mouth daily   Refills:  0            Where to get your medicines      These medications were sent to [id number] [address] [facility] [address]  [address] [address] [place] [address]     Phone:  [phone]     Ipratropium-Albuterol  MCG/ACT inhaler                Protect others around you: Learn how to safely use, store and throw away your medicines at www.disposemymeds.org.             Medication List: This is a list of all your medications and when to take them. Check marks below indicate your daily home schedule. Keep this list as a reference.      Medications           Morning Afternoon Evening Bedtime As Needed    fluticasone-salmeterol 250-50 MCG/DOSE diskus inhaler   Commonly known as:  ADVAIR   Inhale 1 puff into the lungs every 12 hours   Last time this was given:  1 puff on 11/20/2017  7:16 AM                                      furosemide 40 MG tablet   Commonly known as:  LASIX   Take 80 mg by mouth   Last time this was given:  80 mg on 11/20/2017  8:02 AM                                   Ipratropium-Albuterol  MCG/ACT inhaler   Commonly known as:  COMBIVENT RESPIMAT   Inhale 1 puff into the lungs 4 times daily as needed for shortness of breath / dyspnea or wheezing Not to exceed 6 doses per day.                                   METOPROLOL  TARTRATE PO   Take 75 mg by mouth 2 times daily   Last time this was given:  75 mg on 11/20/2017  8:03 AM                                      pantoprazole 40 MG EC tablet   Commonly known as:  PROTONIX   Take 1 tablet (40 mg) by mouth 2 times daily (before meals)   Last time this was given:  40 mg on 11/20/2017  8:03 AM                                      potassium chloride 10 MEQ CR capsule   Commonly known as:  MICRO-K   Take 20 mEq by mouth daily                                   VITAMIN D (CHOLECALCIFEROL) PO   Take 2,000 Units by mouth daily                                   warfarin 4 MG tablet   Commonly known as:  COUMADIN   Take 1 tablet (4 mg) by mouth daily   Start taking on:  11/21/2017   Last time this was given:  4 mg on 11/19/2017  5:16 PM

## 2017-11-16 LAB
ANION GAP SERPL CALCULATED.3IONS-SCNC: 2 MMOL/L (ref 3–14)
BUN SERPL-MCNC: 44 MG/DL (ref 7–30)
CALCIUM SERPL-MCNC: 9 MG/DL (ref 8.5–10.1)
CHLORIDE SERPL-SCNC: 101 MMOL/L (ref 94–109)
CO2 SERPL-SCNC: 37 MMOL/L (ref 20–32)
CREAT SERPL-MCNC: 1.36 MG/DL (ref 0.52–1.04)
ERYTHROCYTE [DISTWIDTH] IN BLOOD BY AUTOMATED COUNT: 16.2 % (ref 10–15)
GFR SERPL CREATININE-BSD FRML MDRD: 37 ML/MIN/1.7M2
GLUCOSE SERPL-MCNC: 125 MG/DL (ref 70–99)
HCT VFR BLD AUTO: 29.8 % (ref 35–47)
HGB BLD-MCNC: 9.7 G/DL (ref 11.7–15.7)
INR PPP: 5.07 (ref 0.86–1.14)
MCH RBC QN AUTO: 31.4 PG (ref 26.5–33)
MCHC RBC AUTO-ENTMCNC: 32.6 G/DL (ref 31.5–36.5)
MCV RBC AUTO: 96 FL (ref 78–100)
PLATELET # BLD AUTO: 146 10E9/L (ref 150–450)
POTASSIUM SERPL-SCNC: 4.5 MMOL/L (ref 3.4–5.3)
RBC # BLD AUTO: 3.09 10E12/L (ref 3.8–5.2)
SODIUM SERPL-SCNC: 140 MMOL/L (ref 133–144)
WBC # BLD AUTO: 10.5 10E9/L (ref 4–11)

## 2017-11-16 PROCEDURE — 25000125 ZZHC RX 250: Performed by: INTERNAL MEDICINE

## 2017-11-16 PROCEDURE — A9270 NON-COVERED ITEM OR SERVICE: HCPCS | Mod: GY | Performed by: INTERNAL MEDICINE

## 2017-11-16 PROCEDURE — 40000275 ZZH STATISTIC RCP TIME EA 10 MIN

## 2017-11-16 PROCEDURE — 85610 PROTHROMBIN TIME: CPT | Performed by: INTERNAL MEDICINE

## 2017-11-16 PROCEDURE — 94640 AIRWAY INHALATION TREATMENT: CPT | Mod: 76

## 2017-11-16 PROCEDURE — 99233 SBSQ HOSP IP/OBS HIGH 50: CPT | Performed by: INTERNAL MEDICINE

## 2017-11-16 PROCEDURE — 36415 COLL VENOUS BLD VENIPUNCTURE: CPT | Performed by: INTERNAL MEDICINE

## 2017-11-16 PROCEDURE — 25000132 ZZH RX MED GY IP 250 OP 250 PS 637: Mod: GY | Performed by: INTERNAL MEDICINE

## 2017-11-16 PROCEDURE — 80048 BASIC METABOLIC PNL TOTAL CA: CPT | Performed by: INTERNAL MEDICINE

## 2017-11-16 PROCEDURE — 25000128 H RX IP 250 OP 636: Performed by: INTERNAL MEDICINE

## 2017-11-16 PROCEDURE — 94640 AIRWAY INHALATION TREATMENT: CPT

## 2017-11-16 PROCEDURE — 12000000 ZZH R&B MED SURG/OB

## 2017-11-16 PROCEDURE — 85027 COMPLETE CBC AUTOMATED: CPT | Performed by: INTERNAL MEDICINE

## 2017-11-16 RX ORDER — ALBUTEROL SULFATE 0.83 MG/ML
2.5 SOLUTION RESPIRATORY (INHALATION)
Status: DISCONTINUED | OUTPATIENT
Start: 2017-11-16 | End: 2017-11-20 | Stop reason: HOSPADM

## 2017-11-16 RX ADMIN — IPRATROPIUM BROMIDE AND ALBUTEROL SULFATE 3 ML: .5; 3 SOLUTION RESPIRATORY (INHALATION) at 14:53

## 2017-11-16 RX ADMIN — METOPROLOL TARTRATE 75 MG: 50 TABLET ORAL at 09:06

## 2017-11-16 RX ADMIN — IPRATROPIUM BROMIDE AND ALBUTEROL SULFATE 3 ML: .5; 3 SOLUTION RESPIRATORY (INHALATION) at 10:49

## 2017-11-16 RX ADMIN — AZITHROMYCIN 250 MG: 250 TABLET, FILM COATED ORAL at 09:01

## 2017-11-16 RX ADMIN — CEFTRIAXONE 1 G: 1 INJECTION, SOLUTION INTRAVENOUS at 05:30

## 2017-11-16 RX ADMIN — IPRATROPIUM BROMIDE AND ALBUTEROL SULFATE 3 ML: .5; 3 SOLUTION RESPIRATORY (INHALATION) at 19:43

## 2017-11-16 RX ADMIN — PANTOPRAZOLE SODIUM 40 MG: 40 TABLET, DELAYED RELEASE ORAL at 09:01

## 2017-11-16 RX ADMIN — METOPROLOL TARTRATE 75 MG: 50 TABLET ORAL at 20:40

## 2017-11-16 RX ADMIN — IPRATROPIUM BROMIDE AND ALBUTEROL SULFATE 3 ML: .5; 3 SOLUTION RESPIRATORY (INHALATION) at 07:09

## 2017-11-16 RX ADMIN — PREDNISONE 40 MG: 20 TABLET ORAL at 09:01

## 2017-11-16 RX ADMIN — FUROSEMIDE 80 MG: 40 TABLET ORAL at 09:01

## 2017-11-16 NOTE — PLAN OF CARE
Problem: Patient Care Overview  Goal: Plan of Care/Patient Progress Review  Outcome: No Change  Patient is A & O times four. VSS. Denied any pain/discomfort. Oxygen at 4liters NC, sat WDL. Plan is to transfer to station 88, when a bed available. Continue to monitor.

## 2017-11-16 NOTE — PLAN OF CARE
Problem: Patient Care Overview  Goal: Plan of Care/Patient Progress Review  Pt a/o x 4. VSS, on 4 L O2 NC with humidification. Weaned back to baseline. Tele is NSR, can have occasional pacing with no spikes. Denies pain. Lungs are diminished, AGARWAL. Coughed up some red streaked sputum this shift, denies worsening from earlier this AM. Tolerating regular diet, up with SBA.

## 2017-11-16 NOTE — PROVIDER NOTIFICATION
Brief update:    Notified that INR greater than 5    Some blood in sputum, not recently noted by nursing staff    No other bleeding reported    Tolerating oral intake    Hold INR dose today, repeat INR tomorrow. No reversal at this time.    Patient with a history of mitral valve repair (vs replacement by notes). Does not appear to have a mechanical mitral valve by chest x-ray review.    Steven Shane MD  6:27 AM

## 2017-11-16 NOTE — PLAN OF CARE
Problem: Patient Care Overview  Goal: Plan of Care/Patient Progress Review  Outcome: Improving  Pt VSS on 4L NC. Tele sinus dysrhythmia in the 70s. A&Ox4. Up with SBA, using bathrom. Denies pain. Slept. Plan to continue abxs therapy. Continue to monitor.

## 2017-11-16 NOTE — PROGRESS NOTES
MD Notification    Notified Person:  MD    Notified Persons Name: Shane     Notification Date/Time: 11/16/17 6:23 AM    Notification Interaction:  Talked with Physician    Purpose of Notification: High INR at 5.07. Vit K?    Orders Received: Hold off on reversing, continue to monitor, watch for signs of bleeding, hold coumadin today.    Comments:

## 2017-11-16 NOTE — PROGRESS NOTES
[facility]    Hospitalist Progress Note    Date of Service (when I saw the patient): 11/16/2017    Assessment & Plan   Neda Dennis is a 87 year old female who was admitted on 11/15/2017. 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 Estrada.   Patient presented to the ER after acute onset shortness of breath. Admitted for further evaluation and treatment.    COPD exacerbation  Community-acquired Pneumonia, possible but felt unlikely.  Shortness of breath responded to nebulizer in emergency department. Afebrile. No elevation of WBC. Requiring 6L O2 on admission, increased from 4L baseline.   Chest x-ray noted pneumonia vs atelectasis in left lower lobe.  Started on rocephin and azithromycin for pneumonia.  Patient does not appear toxic and question if truly atelectasis. Have ordered procalcitonin for tomorrow and would consider discontinuing rocephin if unremarkable.  Continue PTA Advair. Duonebs ordered q 4 hours while awake with PRN albuterol nebs.  Prednisone 40 mg PO 11/15 and 11/16. Will keep at same dose today. Depending on improvement tomorrow, consider short burst of 40 mg dose vs taper.  Continue to wean O2 as tolerated, noting baseline is 4L O2.  Patient is down to 4L at rest today, but desats and feels dyspneic with exertion. Anticipate additional 1-2 days inpatient.    Blood-streaked sputum with elevated INR.  Known squamous cell carcinoma right upper lung, s/p radiation 2009 and followed with observation by Dr. Estrada.  Previous NSCLC right lower lobe status post wedge resection, 2008.  Later on 11/15, patient had small amount of  "blood streaked sputum with cough. No history of hemoptysis and no zhao blood or clots. INR goal 2-3, with patient noting it was elevated on last check 10 days ago, as well. INR 3.63 on admission and increased to 5.07 today, likely affected by antibiotics. Pharmacy managing. No significant bleeding and will not reverse.  Patient has follow up with Dr. Estrada in early 2018. Discussed performing CT here and decided it would not  and patient prefers \"not to know\" what is going on with cancer.      History of mitral valve repair, 2008.  History of chronic atrial fibrillation s/p pacemaker   Chronic anticoagulation with warfarin, goal 2-3.  Hypertension  Currently paced rhythm.  INR supratherapeutic outpatient per patient. Antibiotics contributing. Appreciate pharmacy assistance. No need to reverse at this time.  Continue PTA lasix and metoprolol.    Anemia  Supratherapeutic INR  Hgb 11.9 on admission. Patient has received some IV fluids with medications, but not any boluses.   Hgb has decreased to 10.1, 9.7. Small amount of blood-streaked sputum as above. Known RUL lung cancer being monitored with observation and patient declining CT scan at this time. No sign of bleeding other than small blood-streaked sputum.  Holding warfarin. Pharmacy monitoring.  Will not reverse.  Follow AM Hgb.     DVT Prophylaxis: Warfarin  Code Status: DNR/ DNI    Disposition: Expected discharge in 1-2 days depending on improvement in respiratory status.    Sandra Koenig MD    [phone] (P)  Text page (7am to 6pm)    Interval History   Patient with a few episodes of blood-streaked sputum on coughing. No zhao blood or gross clots. Weaned to 4L O2 at rest, but desats with activity. Afebrile. Otherwise no acute events.    -Data reviewed today: I reviewed all new labs and imaging results over the last 24 hours. I personally reviewed chest x-ray.    Physical Exam   Temp: 98.7  F (37.1  C) Temp src: Oral BP: 106/58   Heart " Rate: 84 Resp: 20 SpO2: 96 % O2 Device: Nasal cannula Oxygen Delivery: 4 LPM  Vitals:    11/15/17 0124 11/15/17 0603 11/16/17 0614   Weight: 61.2 kg (135 lb) 62.1 kg (136 lb 12.8 oz) 61.8 kg (136 lb 3.2 oz)     Vital Signs with Ranges  Temp:  [97.8  F (36.6  C)-98.9  F (37.2  C)] 98.7  F (37.1  C)  Heart Rate:  [68-89] 84  Resp:  [18-20] 20  BP: ()/(48-60) 106/58  SpO2:  [91 %-98 %] 96 %  I/O last 3 completed shifts:  In: 240 [P.O.:240]  Out: 400 [Urine:400]    Constitutional: Elderly woman. Alert and oriented x3. No acute distress. Sitting up with daughter at bedside. Eating breakfast.   Respiratory: Clear to auscultation bilaterally though breath sounds coarse, especially at bilateral bases. No wheezing at time of exam. On 4L NC.  Cardiovascular: Regular rate and rhythm (paced). No murmurs. No peripheral edema.  GI: Soft, nontender.  Extremities: Chronic lower extremity skin changes. No gross deformities.    Medications     Warfarin Therapy Reminder       - MEDICATION INSTRUCTIONS -         sodium chloride (PF)  3 mL Intravenous Q8H     sodium chloride 0.9%  1,000 mL Intravenous Once     ipratropium - albuterol 0.5 mg/2.5 mg/3 mL  3 mL Nebulization Q4H     azithromycin  250 mg Oral Daily     predniSONE  40 mg Oral Daily     cefTRIAXone  1 g Intravenous Q24H     furosemide  80 mg Oral Daily     metoprolol (LOPRESSOR) tablet 75 mg  75 mg Oral BID     pantoprazole  40 mg Oral Daily     fluticasone-salmeterol  1 puff Inhalation Q12H       Data     Recent Labs  Lab 11/16/17  0547 11/15/17  1635 11/15/17  0935 11/15/17  0133   WBC 10.5  --   --  9.9   HGB 9.7* 10.1*  --  11.9   MCV 96  --   --  100   *  --   --  186   INR 5.07*  --  3.63*  --      --   --  141   POTASSIUM 4.5  --   --  4.6   CHLORIDE 101  --   --  99   CO2 37*  --   --  37*   BUN 44*  --   --  31*   CR 1.36*  --   --  1.17*   ANIONGAP 2*  --   --  5   JENNIE 9.0  --   --  9.6   *  --   --  160*   ALBUMIN  --   --   --  3.8    PROTTOTAL  --   --   --  8.1   BILITOTAL  --   --   --  0.6   ALKPHOS  --   --   --  72   ALT  --   --   --  15   AST  --   --   --  24       Imaging:  No results found for this or any previous visit (from the past 24 hour(s)).

## 2017-11-17 ENCOUNTER — APPOINTMENT (OUTPATIENT)
Dept: PHYSICAL THERAPY | Facility: CLINIC | Age: 82
DRG: 190 | End: 2017-11-17
Attending: INTERNAL MEDICINE
Payer: MEDICARE

## 2017-11-17 LAB
ANION GAP SERPL CALCULATED.3IONS-SCNC: 3 MMOL/L (ref 3–14)
BUN SERPL-MCNC: 52 MG/DL (ref 7–30)
CALCIUM SERPL-MCNC: 9.3 MG/DL (ref 8.5–10.1)
CHLORIDE SERPL-SCNC: 103 MMOL/L (ref 94–109)
CO2 SERPL-SCNC: 36 MMOL/L (ref 20–32)
CREAT SERPL-MCNC: 1.32 MG/DL (ref 0.52–1.04)
ERYTHROCYTE [DISTWIDTH] IN BLOOD BY AUTOMATED COUNT: 16.6 % (ref 10–15)
GFR SERPL CREATININE-BSD FRML MDRD: 38 ML/MIN/1.7M2
GLUCOSE SERPL-MCNC: 116 MG/DL (ref 70–99)
HCT VFR BLD AUTO: 32.1 % (ref 35–47)
HGB BLD-MCNC: 10.4 G/DL (ref 11.7–15.7)
INR PPP: 2.74 (ref 0.86–1.14)
MAGNESIUM SERPL-MCNC: 2.4 MG/DL (ref 1.6–2.3)
MCH RBC QN AUTO: 31.3 PG (ref 26.5–33)
MCHC RBC AUTO-ENTMCNC: 32.4 G/DL (ref 31.5–36.5)
MCV RBC AUTO: 97 FL (ref 78–100)
PHOSPHATE SERPL-MCNC: 2.7 MG/DL (ref 2.5–4.5)
PLATELET # BLD AUTO: 165 10E9/L (ref 150–450)
POTASSIUM SERPL-SCNC: 4.1 MMOL/L (ref 3.4–5.3)
PROCALCITONIN SERPL-MCNC: 4.26 NG/ML
RBC # BLD AUTO: 3.32 10E12/L (ref 3.8–5.2)
SODIUM SERPL-SCNC: 142 MMOL/L (ref 133–144)
WBC # BLD AUTO: 11.1 10E9/L (ref 4–11)

## 2017-11-17 PROCEDURE — 85610 PROTHROMBIN TIME: CPT | Performed by: INTERNAL MEDICINE

## 2017-11-17 PROCEDURE — 97161 PT EVAL LOW COMPLEX 20 MIN: CPT | Mod: GP

## 2017-11-17 PROCEDURE — A9270 NON-COVERED ITEM OR SERVICE: HCPCS | Mod: GY | Performed by: INTERNAL MEDICINE

## 2017-11-17 PROCEDURE — 40000193 ZZH STATISTIC PT WARD VISIT

## 2017-11-17 PROCEDURE — 94640 AIRWAY INHALATION TREATMENT: CPT | Mod: 76

## 2017-11-17 PROCEDURE — 25000132 ZZH RX MED GY IP 250 OP 250 PS 637: Mod: GY | Performed by: INTERNAL MEDICINE

## 2017-11-17 PROCEDURE — 25000128 H RX IP 250 OP 636: Performed by: INTERNAL MEDICINE

## 2017-11-17 PROCEDURE — 12000000 ZZH R&B MED SURG/OB

## 2017-11-17 PROCEDURE — 83735 ASSAY OF MAGNESIUM: CPT | Performed by: INTERNAL MEDICINE

## 2017-11-17 PROCEDURE — 25000125 ZZHC RX 250: Performed by: INTERNAL MEDICINE

## 2017-11-17 PROCEDURE — 36415 COLL VENOUS BLD VENIPUNCTURE: CPT | Performed by: INTERNAL MEDICINE

## 2017-11-17 PROCEDURE — 99232 SBSQ HOSP IP/OBS MODERATE 35: CPT | Performed by: INTERNAL MEDICINE

## 2017-11-17 PROCEDURE — 40000275 ZZH STATISTIC RCP TIME EA 10 MIN

## 2017-11-17 PROCEDURE — 80048 BASIC METABOLIC PNL TOTAL CA: CPT | Performed by: INTERNAL MEDICINE

## 2017-11-17 PROCEDURE — 85027 COMPLETE CBC AUTOMATED: CPT | Performed by: INTERNAL MEDICINE

## 2017-11-17 PROCEDURE — 94640 AIRWAY INHALATION TREATMENT: CPT

## 2017-11-17 PROCEDURE — 84145 PROCALCITONIN (PCT): CPT | Performed by: INTERNAL MEDICINE

## 2017-11-17 PROCEDURE — 84100 ASSAY OF PHOSPHORUS: CPT | Performed by: INTERNAL MEDICINE

## 2017-11-17 RX ORDER — WARFARIN SODIUM 2.5 MG/1
2.5 TABLET ORAL
Status: COMPLETED | OUTPATIENT
Start: 2017-11-17 | End: 2017-11-17

## 2017-11-17 RX ADMIN — FUROSEMIDE 80 MG: 40 TABLET ORAL at 07:53

## 2017-11-17 RX ADMIN — IPRATROPIUM BROMIDE AND ALBUTEROL SULFATE 3 ML: .5; 3 SOLUTION RESPIRATORY (INHALATION) at 22:56

## 2017-11-17 RX ADMIN — METOPROLOL TARTRATE 75 MG: 50 TABLET ORAL at 07:53

## 2017-11-17 RX ADMIN — WARFARIN SODIUM 2.5 MG: 2.5 TABLET ORAL at 17:55

## 2017-11-17 RX ADMIN — CEFTRIAXONE 1 G: 1 INJECTION, SOLUTION INTRAVENOUS at 04:44

## 2017-11-17 RX ADMIN — IPRATROPIUM BROMIDE AND ALBUTEROL SULFATE 3 ML: .5; 3 SOLUTION RESPIRATORY (INHALATION) at 03:39

## 2017-11-17 RX ADMIN — IPRATROPIUM BROMIDE AND ALBUTEROL SULFATE 3 ML: .5; 3 SOLUTION RESPIRATORY (INHALATION) at 15:14

## 2017-11-17 RX ADMIN — PANTOPRAZOLE SODIUM 40 MG: 40 TABLET, DELAYED RELEASE ORAL at 07:53

## 2017-11-17 RX ADMIN — AZITHROMYCIN 250 MG: 250 TABLET, FILM COATED ORAL at 07:53

## 2017-11-17 RX ADMIN — IPRATROPIUM BROMIDE AND ALBUTEROL SULFATE 3 ML: .5; 3 SOLUTION RESPIRATORY (INHALATION) at 07:41

## 2017-11-17 RX ADMIN — IPRATROPIUM BROMIDE AND ALBUTEROL SULFATE 3 ML: .5; 3 SOLUTION RESPIRATORY (INHALATION) at 00:19

## 2017-11-17 RX ADMIN — IPRATROPIUM BROMIDE AND ALBUTEROL SULFATE 3 ML: .5; 3 SOLUTION RESPIRATORY (INHALATION) at 11:19

## 2017-11-17 RX ADMIN — METOPROLOL TARTRATE 75 MG: 50 TABLET ORAL at 21:14

## 2017-11-17 RX ADMIN — IPRATROPIUM BROMIDE AND ALBUTEROL SULFATE 3 ML: .5; 3 SOLUTION RESPIRATORY (INHALATION) at 19:14

## 2017-11-17 RX ADMIN — PREDNISONE 40 MG: 20 TABLET ORAL at 07:52

## 2017-11-17 NOTE — PLAN OF CARE
Problem: Patient Care Overview  Goal: Plan of Care/Patient Progress Review  Outcome: Improving  A &O x 4, VSS, no c/o pain. On 4L O2, baseline for pt. Lungs w/ crackles in bases. Tele: 100% V paced. PIV SL. INR 5.07, pharmacy aware and dosing. Will continue to monitor.

## 2017-11-17 NOTE — PLAN OF CARE
Problem: Patient Care Overview  Goal: Plan of Care/Patient Progress Review  Outcome: No Change  A&O, calls appropriately. Up Independent in room, SBA in hallways (ambulated x 2 with pulse ox, stayed in 90s with walking). On 4L NC (baseline). Denies SOB or pain. Plan for possible discharge tomorrow. Continue to monitor.

## 2017-11-17 NOTE — PLAN OF CARE
Problem: Patient Care Overview  Goal: Plan of Care/Patient Progress Review  Physical Therapy: Order received, chart reviewed and evaluation completed. Pt is an 87 year old female with COPD and hx of lung cancer, admitted with c/o SOB. At baseline, pt reports that she lives by herself in a home with 2 steps to enter and 10 steps to bedroom. Pt reports independence with mobility and ADLs and no use of AD PTA.     Discharge Planner PT   Patient plan for discharge: Home  Current status: Pt demonstrates ability to perform bed mobility, transfers and ambulation 150' independently. Pt ascends/descends 10 steps with handrail modified independent. Pt on 4L O2 via NC, VSS throughout activity. Will complete PT order and discharge from therapy as she is independent with mobility. Recommend patient ambulate 4x/day to maintain current status.  Barriers to return to prior living situation: None anticipated  Recommendations for discharge: Home with intermittent assist from family as needed  Rationale for recommendations: Pt demonstrates independence with mobility       Entered by: Farrah Rivas 11/17/2017 3:04 PM

## 2017-11-17 NOTE — PROGRESS NOTES
11/17/17 1500   Quick Adds   Type of Visit Initial PT Evaluation   Living Environment   Lives With alone   Living Arrangements house   Home Accessibility stairs to enter home;stairs within home;bed not on first floor   Number of Stairs to Enter Home 2   Number of Stairs Within Home 10   Stair Railings at Home inside, present on left side;outside, present on left side   Transportation Available car;family or friend will provide   Self-Care   Dominant Hand right   Usual Activity Tolerance good   Current Activity Tolerance good   Regular Exercise no   Equipment Currently Used at Home none   Functional Level Prior   Ambulation 0-->independent   Transferring 0-->independent   Fall history within last six months no   Which of the above functional risks had a recent onset or change? none   Prior Functional Level Comment Pt reports independence with mobility and ADLs prior to admit   General Information   Onset of Illness/Injury or Date of Surgery - Date 11/15/17   Referring Physician Dr. Gibson   Patient/Family Goals Statement To go home   Pertinent History of Current Problem (include personal factors and/or comorbidities that impact the POC) Pt is an 87 year old female with COPD and hx of lung cancer, admitted with c/o SOB   Cognitive Status Examination   Orientation orientation to person, place and time   Level of Consciousness alert   Pain Assessment   Patient Currently in Pain No   Range of Motion (ROM)   ROM Quick Adds No deficits were identified   Strength   Manual Muscle Testing Quick Adds No deficits were identified   Bed Mobility   Bed Mobility Comments Independent   Transfer Skills   Transfer Comments Independent   Gait   Gait Comments 150' no AD independent, up/down 10 steps handrail modified independent   Balance   Balance Comments Good in sitting and standing   Clinical Impression   Criteria for Skilled Therapeutic Intervention evaluation only   Clinical Presentation Stable/Uncomplicated   Clinical  "Presentation Rationale VSS   Clinical Decision Making (Complexity) Low complexity   Predicted Duration of Therapy Intervention (days/wks) 1 day   Anticipated Discharge Disposition Home   Risk & Benefits of therapy have been explained Yes   Patient, Family & other staff in agreement with plan of care Yes   [facility] TM \"6 Clicks\"   2016, Trustees of [facility], under license to [employer].  All rights reserved.   6 Clicks Short Forms Basic Mobility Inpatient Short Form   [facility]  \"6 Clicks\" V.2 Basic Mobility Inpatient Short Form   1. Turning from your back to your side while in a flat bed without using bedrails? 4 - None   2. Moving from lying on your back to sitting on the side of a flat bed without using bedrails? 4 - None   3. Moving to and from a bed to a chair (including a wheelchair)? 4 - None   4. Standing up from a chair using your arms (e.g., wheelchair, or bedside chair)? 4 - None   5. To walk in hospital room? 4 - None   6. Climbing 3-5 steps with a railing? 4 - None   Basic Mobility Raw Score (Score out of 24.Lower scores equate to lower levels of function) 24   Total Evaluation Time   Total Evaluation Time (Minutes) 20     "

## 2017-11-17 NOTE — PROGRESS NOTES
[facility]    Hospitalist Progress Note    Date of Service (when I saw the patient): 11/17/2017    Assessment & Plan   Neda Dennis is a 87 year old female who was admitted on 11/15/2017. 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 Estrada.   Patient presented to the ER after acute onset shortness of breath. Admitted for further evaluation and treatment.    COPD exacerbation  Community-acquired Pneumonia, possible but felt unlikely.  Shortness of breath responded to nebulizer in emergency department. Afebrile. No elevation of WBC. Requiring 6L O2 on admission, increased from 4L baseline.   Chest x-ray noted pneumonia vs atelectasis in left lower lobe.   Started on rocephin and azithromycin for pneumonia.  Patient does not appear toxic however she does reports she is not quite back to her baseline respiratory status. Procalcitonin IS elevated which is suggestive of infection.  Continue PTA Advair. Duonebs ordered q 4 hours while awake with PRN albuterol nebs.  Prednisone 40 mg PO daily since 11/15. Will keep at same dose today. Depending on improvement tomorrow, consider short burst of 40 mg dose vs taper.  Continue to wean O2 as tolerated, noting baseline is 4L O2.  Patient is down to 4L at rest, but desats and feels dyspneic with exertion. Anticipate additional 1 day or so inpatient.    Blood-streaked sputum with elevated INR.  Known squamous cell carcinoma right upper lung, s/p radiation 2009 and followed with observation by Dr. Estrada.  Previous NSCLC right lower lobe status post wedge resection, 2008.  Later on 11/15, patient had small  "amount of blood streaked sputum with cough. No history of hemoptysis and no zhao blood or clots. INR goal 2-3, with patient noting it was elevated on last check 10 days PTA, as well. INR 3.63 on admission and increased to 5.07 on 11/16, likely affected by antibiotics. 2.74 on 11/17/17.  Pharmacy managing.  Patient has follow up with Dr. Estrada in early 2018. Discussed performing CT here and decided it would not  and patient prefers \"not to know\" what is going on with cancer.      History of mitral valve repair, 2008.  History of chronic atrial fibrillation s/p pacemaker   Chronic anticoagulation with warfarin, goal 2-3.  Hypertension  Currently paced rhythm.  INR supratherapeutic outpatient per patient. Antibiotics contributing. Appreciate pharmacy assistance.   Continue PTA lasix and metoprolol.    Anemia  Supratherapeutic INR  Hgb 11.9 on admission. Patient has received some IV fluids with medications, but not any boluses.   Hgb has decreased to 9.7 gm/dl. Now rebounding. Small amount of blood-streaked sputum as above. Known RUL lung cancer being monitored with observation and patient declining CT scan at this time. No sign of bleeding other than small blood-streaked sputum.  Restarting warfarin. Pharmacy monitoring.  Follow Hgb.     DVT Prophylaxis: Warfarin  Code Status: DNR/ DNI    Disposition: Expected discharge likely over the weekend pending improvement in respiratory status. Will ask PT to see if she is capable of returning to independent living.     Saúl Gibson MD    [phone] (P)  Text page (7am to 6pm)    Interval History   Feels her breathing is almost back to her baseline.     -Data reviewed today: I reviewed all new labs and imaging results over the last 24 hours. I personally reviewed chest x-ray.    Physical Exam   Temp: 96.1  F (35.6  C) Temp src: Oral BP: 135/56 Pulse: 79 Heart Rate: 73 Resp: 17 SpO2: 96 % O2 Device: Nasal cannula with humidification Oxygen Delivery: 4 " LPM  Vitals:    11/15/17 0124 11/15/17 0603 11/16/17 0614   Weight: 61.2 kg (135 lb) 62.1 kg (136 lb 12.8 oz) 61.8 kg (136 lb 3.2 oz)     Vital Signs with Ranges  Temp:  [96.1  F (35.6  C)-98.7  F (37.1  C)] 96.1  F (35.6  C)  Pulse:  [79] 79  Heart Rate:  [64-83] 73  Resp:  [16-18] 17  BP: (100-140)/(53-62) 135/56  SpO2:  [94 %-98 %] 96 %  I/O last 3 completed shifts:  In: 240 [P.O.:240]  Out: 550 [Urine:550]    Constitutional: Elderly woman. Alert and oriented x3. No acute distress. Sitting up with daughter at bedside.   Respiratory: No wheezing. Some faint crackles at left base.   Cardiovascular: Regular rate and rhythm (paced). No murmurs. No peripheral edema.  GI: Soft, nontender.  Extremities: Chronic lower extremity skin changes. No gross deformities.  Neuro: CN II-XII intact    Medications     Warfarin Therapy Reminder       - MEDICATION INSTRUCTIONS -         sodium chloride (PF)  3 mL Intravenous Q8H     sodium chloride 0.9%  1,000 mL Intravenous Once     ipratropium - albuterol 0.5 mg/2.5 mg/3 mL  3 mL Nebulization Q4H     azithromycin  250 mg Oral Daily     predniSONE  40 mg Oral Daily     cefTRIAXone  1 g Intravenous Q24H     furosemide  80 mg Oral Daily     metoprolol (LOPRESSOR) tablet 75 mg  75 mg Oral BID     pantoprazole  40 mg Oral Daily     fluticasone-salmeterol  1 puff Inhalation Q12H       Data     Recent Labs  Lab 11/17/17  0718 11/16/17  0547 11/15/17  1635 11/15/17  0935 11/15/17  0133   WBC 11.1* 10.5  --   --  9.9   HGB 10.4* 9.7* 10.1*  --  11.9   MCV 97 96  --   --  100    146*  --   --  186   INR 2.74* 5.07*  --  3.63*  --     140  --   --  141   POTASSIUM 4.1 4.5  --   --  4.6   CHLORIDE 103 101  --   --  99   CO2 36* 37*  --   --  37*   BUN 52* 44*  --   --  31*   CR 1.32* 1.36*  --   --  1.17*   ANIONGAP 3 2*  --   --  5   JENNIE 9.3 9.0  --   --  9.6   * 125*  --   --  160*   ALBUMIN  --   --   --   --  3.8   PROTTOTAL  --   --   --   --  8.1   BILITOTAL  --   --    --   --  0.6   ALKPHOS  --   --   --   --  72   ALT  --   --   --   --  15   AST  --   --   --   --  24       Imaging:  No results found for this or any previous visit (from the past 24 hour(s)).

## 2017-11-17 NOTE — PLAN OF CARE
Problem: Patient Care Overview  Goal: Plan of Care/Patient Progress Review  Outcome: No Change  Admitted from [facility] center. VSS, denies pain. 4L baseline for pt. Lungs w/ crackles in bases. Tele V paced, on metoprolol and permanent pacemaker. PIV SL. INR 5.07, pharmacy aware and dosing. Will continue to monitor.

## 2017-11-18 LAB — INR PPP: 2.37 (ref 0.86–1.14)

## 2017-11-18 PROCEDURE — 25000132 ZZH RX MED GY IP 250 OP 250 PS 637: Mod: GY | Performed by: INTERNAL MEDICINE

## 2017-11-18 PROCEDURE — 12000000 ZZH R&B MED SURG/OB

## 2017-11-18 PROCEDURE — 25000125 ZZHC RX 250: Performed by: INTERNAL MEDICINE

## 2017-11-18 PROCEDURE — 40000275 ZZH STATISTIC RCP TIME EA 10 MIN

## 2017-11-18 PROCEDURE — A9270 NON-COVERED ITEM OR SERVICE: HCPCS | Mod: GY | Performed by: INTERNAL MEDICINE

## 2017-11-18 PROCEDURE — 94640 AIRWAY INHALATION TREATMENT: CPT | Mod: 76

## 2017-11-18 PROCEDURE — 99232 SBSQ HOSP IP/OBS MODERATE 35: CPT | Performed by: INTERNAL MEDICINE

## 2017-11-18 PROCEDURE — 85610 PROTHROMBIN TIME: CPT | Performed by: INTERNAL MEDICINE

## 2017-11-18 PROCEDURE — 94640 AIRWAY INHALATION TREATMENT: CPT

## 2017-11-18 PROCEDURE — 25000128 H RX IP 250 OP 636: Performed by: INTERNAL MEDICINE

## 2017-11-18 PROCEDURE — 36415 COLL VENOUS BLD VENIPUNCTURE: CPT | Performed by: INTERNAL MEDICINE

## 2017-11-18 RX ORDER — WARFARIN SODIUM 2.5 MG/1
2.5 TABLET ORAL
Status: COMPLETED | OUTPATIENT
Start: 2017-11-18 | End: 2017-11-18

## 2017-11-18 RX ADMIN — METOPROLOL TARTRATE 75 MG: 50 TABLET ORAL at 21:39

## 2017-11-18 RX ADMIN — IPRATROPIUM BROMIDE AND ALBUTEROL SULFATE 3 ML: .5; 3 SOLUTION RESPIRATORY (INHALATION) at 19:17

## 2017-11-18 RX ADMIN — IPRATROPIUM BROMIDE AND ALBUTEROL SULFATE 3 ML: .5; 3 SOLUTION RESPIRATORY (INHALATION) at 23:36

## 2017-11-18 RX ADMIN — IPRATROPIUM BROMIDE AND ALBUTEROL SULFATE 3 ML: .5; 3 SOLUTION RESPIRATORY (INHALATION) at 11:17

## 2017-11-18 RX ADMIN — FUROSEMIDE 80 MG: 40 TABLET ORAL at 08:21

## 2017-11-18 RX ADMIN — CEFTRIAXONE 1 G: 1 INJECTION, SOLUTION INTRAVENOUS at 05:10

## 2017-11-18 RX ADMIN — METOPROLOL TARTRATE 75 MG: 50 TABLET ORAL at 08:22

## 2017-11-18 RX ADMIN — IPRATROPIUM BROMIDE AND ALBUTEROL SULFATE 3 ML: .5; 3 SOLUTION RESPIRATORY (INHALATION) at 04:02

## 2017-11-18 RX ADMIN — WARFARIN SODIUM 2.5 MG: 2.5 TABLET ORAL at 18:14

## 2017-11-18 RX ADMIN — IPRATROPIUM BROMIDE AND ALBUTEROL SULFATE 3 ML: .5; 3 SOLUTION RESPIRATORY (INHALATION) at 15:11

## 2017-11-18 RX ADMIN — PANTOPRAZOLE SODIUM 40 MG: 40 TABLET, DELAYED RELEASE ORAL at 08:21

## 2017-11-18 RX ADMIN — AZITHROMYCIN 250 MG: 250 TABLET, FILM COATED ORAL at 08:21

## 2017-11-18 RX ADMIN — PREDNISONE 40 MG: 20 TABLET ORAL at 08:22

## 2017-11-18 RX ADMIN — IPRATROPIUM BROMIDE AND ALBUTEROL SULFATE 3 ML: .5; 3 SOLUTION RESPIRATORY (INHALATION) at 07:41

## 2017-11-18 NOTE — PLAN OF CARE
Problem: Patient Care Overview  Goal: Plan of Care/Patient Progress Review  Outcome: No Change  A&O, VSS on 4L NC. Independent in room, SBA in room. Tele 100% V paced. Regular diet. No complaints this evening. Will continue to monitor.

## 2017-11-18 NOTE — PROGRESS NOTES
Patient is on a 4L NC with SpO2 in the mid to upper 90's. BS diminished. All nebs were given as ordered.  Will cont to follow.  11/18/2017  Agnes Henao RRT

## 2017-11-18 NOTE — PLAN OF CARE
Problem: Patient Care Overview  Goal: Plan of Care/Patient Progress Review  Outcome: No Change  A&O x4, calls appropriately. VSS. Tele: V paced.  Denies pain/SOB. Up Independent in room, SBA in hallway, took a shower this evening. On 4L NC (baseline). Plan for possible discharge tomorrow. Continue to monitor.

## 2017-11-18 NOTE — PROGRESS NOTES
[facility]    Hospitalist Progress Note    Date of Service (when I saw the patient): 11/18/2017    Assessment & Plan   Neda Dennis is a 87 year old female who was admitted on 11/15/2017. 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 Estrada.   Patient presented to the ER after acute onset shortness of breath. Admitted for further evaluation and treatment.    COPD exacerbation  Community-acquired Pneumonia, possible but felt unlikely.  Shortness of breath responded to nebulizer in emergency department. Afebrile. No elevation of WBC. Requiring 6L O2 on admission, increased from 4L baseline.   Chest x-ray noted pneumonia vs atelectasis in left lower lobe.   Started on rocephin and azithromycin for CAP of LLL.  Patient does not appear toxic however she does reports she is not quite back to her baseline respiratory status. Procalcitonin IS elevated which is suggestive of infection.  Continue PTA Advair. Duonebs ordered q 4 hours while awake with PRN albuterol nebs.  Prednisone 40 mg PO daily since 11/15. Start tapering going down 10 mg every 3 days.   Continue to wean O2 as tolerated, noting baseline is 4L O2.  Patient is down to 4L at rest, but desats and feels dyspneic with exertion. Anticipate additional 1 day or so inpatient.    Blood-streaked sputum with elevated INR.  Known squamous cell carcinoma right upper lung, s/p radiation 2009 and followed with observation by Dr. Estrada.  Previous NSCLC right lower lobe status post wedge resection, 2008.  Later on 11/15, patient had small amount of blood streaked sputum with cough. No history of  "hemoptysis and no zhao blood or clots. INR goal 2-3, with patient noting it was elevated on last check 10 days PTA, as well. INR 3.63 on admission and increased to 5.07 on 11/16, likely affected by antibiotics. 2.74 on 11/17/17.  Pharmacy managing.  Patient has follow up with Dr. Estrada in early 2018. Discussed performing CT here and decided it would not  and patient prefers \"not to know\" what is going on with cancer.  Still reports small amounts of dark bloody sputum. Will recheck cbc in am.       History of mitral valve repair, 2008.  History of chronic atrial fibrillation s/p pacemaker   Chronic anticoagulation with warfarin, goal 2-3.  Hypertension  Currently paced rhythm.  INR supratherapeutic outpatient per patient. Antibiotics contributing. Appreciate pharmacy assistance.   Continue PTA lasix and metoprolol.    Anemia  Supratherapeutic INR  Hgb 11.9 on admission. Patient has received some IV fluids with medications, but not any boluses.   Hgb has decreased to 9.7 gm/dl. Now rebounding. Small amount of blood-streaked sputum as above. Known RUL lung cancer being monitored with observation and patient declining CT scan at this time. No sign of bleeding other than small blood-streaked sputum.  Restarted warfarin. Pharmacy monitoring.  Follow Hgb.     DVT Prophylaxis: Warfarin  Code Status: DNR/ DNI    Disposition: Expected discharge likely 1-2 days. PT eval suggests she can return home with intermittent family support.      Saúl Gibson MD  [phone]    Interval History   Feels her breathing is almost back to her baseline. Still coughing up blood streaked sputum.     -Data reviewed today: I reviewed all new labs and imaging results over the last 24 hours. I personally reviewed chest x-ray.    Physical Exam   Temp: 98.1  F (36.7  C) Temp src: Oral BP: 117/60   Heart Rate: 67 Resp: 18 SpO2: 92 % O2 Device: Nasal cannula with humidification Oxygen Delivery: 4 LPM  Vitals:    11/15/17 0124 " 11/15/17 0603 11/16/17 0614   Weight: 61.2 kg (135 lb) 62.1 kg (136 lb 12.8 oz) 61.8 kg (136 lb 3.2 oz)     Vital Signs with Ranges  Temp:  [97.7  F (36.5  C)-98.1  F (36.7  C)] 98.1  F (36.7  C)  Heart Rate:  [61-88] 67  Resp:  [16-18] 18  BP: (117-130)/(42-62) 117/60  SpO2:  [92 %-99 %] 92 %  I/O last 3 completed shifts:  In: 240 [P.O.:240]  Out: -     Constitutional: Elderly woman. Alert and oriented x3. No acute distress. Sitting up with daughter at bedside.   Respiratory: No wheezing. Some faint crackles at left base.   Cardiovascular: Regular rate and rhythm (paced). No murmurs. No peripheral edema.  GI: Soft, nontender.  Extremities: Chronic lower extremity skin changes. No gross deformities.  Neuro: CN II-XII intact    Medications     Warfarin Therapy Reminder       - MEDICATION INSTRUCTIONS -         warfarin  2.5 mg Oral ONCE at 18:00     sodium chloride (PF)  3 mL Intravenous Q8H     sodium chloride 0.9%  1,000 mL Intravenous Once     ipratropium - albuterol 0.5 mg/2.5 mg/3 mL  3 mL Nebulization Q4H     azithromycin  250 mg Oral Daily     predniSONE  40 mg Oral Daily     cefTRIAXone  1 g Intravenous Q24H     furosemide  80 mg Oral Daily     metoprolol (LOPRESSOR) tablet 75 mg  75 mg Oral BID     pantoprazole  40 mg Oral Daily     fluticasone-salmeterol  1 puff Inhalation Q12H       Data     Recent Labs  Lab 11/18/17  0640 11/17/17  0718 11/16/17  0547 11/15/17  1635  11/15/17  0133   WBC  --  11.1* 10.5  --   --  9.9   HGB  --  10.4* 9.7* 10.1*  --  11.9   MCV  --  97 96  --   --  100   PLT  --  165 146*  --   --  186   INR 2.37* 2.74* 5.07*  --   < >  --    NA  --  142 140  --   --  141   POTASSIUM  --  4.1 4.5  --   --  4.6   CHLORIDE  --  103 101  --   --  99   CO2  --  36* 37*  --   --  37*   BUN  --  52* 44*  --   --  31*   CR  --  1.32* 1.36*  --   --  1.17*   ANIONGAP  --  3 2*  --   --  5   JENNIE  --  9.3 9.0  --   --  9.6   GLC  --  116* 125*  --   --  160*   ALBUMIN  --   --   --   --   --  3.8    PROTTOTAL  --   --   --   --   --  8.1   BILITOTAL  --   --   --   --   --  0.6   ALKPHOS  --   --   --   --   --  72   ALT  --   --   --   --   --  15   AST  --   --   --   --   --  24   < > = values in this interval not displayed.    Imaging:  No results found for this or any previous visit (from the past 24 hour(s)).

## 2017-11-18 NOTE — PLAN OF CARE
Problem: Patient Care Overview  Goal: Plan of Care/Patient Progress Review  Outcome: No Change  A/o. VSS on 4L (baseline for pt). Denied pain. LS dim, fine crackles LLL. Tele: 100% v-paced. Bs active, +flatus. Voiding adequately. Ambulated win with SBA on day shift. On abx. Possibly discharge tomorrow. Continue to monitor.

## 2017-11-18 NOTE — PLAN OF CARE
Problem: Patient Care Overview  Goal: Plan of Care/Patient Progress Review  Outcome: No Change  A&O x4, pleasant. Denies pain. VSS. Tele, irregular and 100% V paced. On 4 liters O2 per NC baseline. Sats in the mid 90's. Voiding in bathroom. Independent in room. PIV SL; on antibiotics. Possible discharge today.

## 2017-11-19 LAB
ERYTHROCYTE [DISTWIDTH] IN BLOOD BY AUTOMATED COUNT: 16.2 % (ref 10–15)
HCT VFR BLD AUTO: 32 % (ref 35–47)
HGB BLD-MCNC: 10.4 G/DL (ref 11.7–15.7)
INR PPP: 2.13 (ref 0.86–1.14)
MCH RBC QN AUTO: 31.5 PG (ref 26.5–33)
MCHC RBC AUTO-ENTMCNC: 32.5 G/DL (ref 31.5–36.5)
MCV RBC AUTO: 97 FL (ref 78–100)
PLATELET # BLD AUTO: 167 10E9/L (ref 150–450)
RBC # BLD AUTO: 3.3 10E12/L (ref 3.8–5.2)
TROPONIN I SERPL-MCNC: 0.03 UG/L (ref 0–0.04)
WBC # BLD AUTO: 5.9 10E9/L (ref 4–11)

## 2017-11-19 PROCEDURE — 99232 SBSQ HOSP IP/OBS MODERATE 35: CPT | Performed by: INTERNAL MEDICINE

## 2017-11-19 PROCEDURE — 25000132 ZZH RX MED GY IP 250 OP 250 PS 637: Mod: GY | Performed by: INTERNAL MEDICINE

## 2017-11-19 PROCEDURE — 84484 ASSAY OF TROPONIN QUANT: CPT | Performed by: INTERNAL MEDICINE

## 2017-11-19 PROCEDURE — A9270 NON-COVERED ITEM OR SERVICE: HCPCS | Mod: GY | Performed by: INTERNAL MEDICINE

## 2017-11-19 PROCEDURE — 93010 ELECTROCARDIOGRAM REPORT: CPT | Performed by: INTERNAL MEDICINE

## 2017-11-19 PROCEDURE — 36415 COLL VENOUS BLD VENIPUNCTURE: CPT | Performed by: INTERNAL MEDICINE

## 2017-11-19 PROCEDURE — 12000000 ZZH R&B MED SURG/OB

## 2017-11-19 PROCEDURE — 25000125 ZZHC RX 250: Performed by: INTERNAL MEDICINE

## 2017-11-19 PROCEDURE — 94640 AIRWAY INHALATION TREATMENT: CPT

## 2017-11-19 PROCEDURE — 93005 ELECTROCARDIOGRAM TRACING: CPT

## 2017-11-19 PROCEDURE — 85027 COMPLETE CBC AUTOMATED: CPT | Performed by: INTERNAL MEDICINE

## 2017-11-19 PROCEDURE — 25000128 H RX IP 250 OP 636: Performed by: INTERNAL MEDICINE

## 2017-11-19 PROCEDURE — 40000275 ZZH STATISTIC RCP TIME EA 10 MIN

## 2017-11-19 PROCEDURE — 94640 AIRWAY INHALATION TREATMENT: CPT | Mod: 76

## 2017-11-19 PROCEDURE — 85610 PROTHROMBIN TIME: CPT | Performed by: INTERNAL MEDICINE

## 2017-11-19 RX ORDER — POTASSIUM CHLORIDE 29.8 MG/ML
20 INJECTION INTRAVENOUS
Status: DISCONTINUED | OUTPATIENT
Start: 2017-11-19 | End: 2017-11-20 | Stop reason: HOSPADM

## 2017-11-19 RX ORDER — POTASSIUM CL/LIDO/0.9 % NACL 10MEQ/0.1L
10 INTRAVENOUS SOLUTION, PIGGYBACK (ML) INTRAVENOUS
Status: DISCONTINUED | OUTPATIENT
Start: 2017-11-19 | End: 2017-11-20 | Stop reason: HOSPADM

## 2017-11-19 RX ORDER — POTASSIUM CHLORIDE 1.5 G/1.58G
20-40 POWDER, FOR SOLUTION ORAL
Status: DISCONTINUED | OUTPATIENT
Start: 2017-11-19 | End: 2017-11-20 | Stop reason: HOSPADM

## 2017-11-19 RX ORDER — MAGNESIUM SULFATE HEPTAHYDRATE 40 MG/ML
4 INJECTION, SOLUTION INTRAVENOUS EVERY 4 HOURS PRN
Status: DISCONTINUED | OUTPATIENT
Start: 2017-11-19 | End: 2017-11-20 | Stop reason: HOSPADM

## 2017-11-19 RX ORDER — POTASSIUM CHLORIDE 1500 MG/1
20-40 TABLET, EXTENDED RELEASE ORAL
Status: DISCONTINUED | OUTPATIENT
Start: 2017-11-19 | End: 2017-11-20 | Stop reason: HOSPADM

## 2017-11-19 RX ORDER — WARFARIN SODIUM 2 MG/1
4 TABLET ORAL
Status: COMPLETED | OUTPATIENT
Start: 2017-11-19 | End: 2017-11-19

## 2017-11-19 RX ORDER — POTASSIUM CHLORIDE 7.45 MG/ML
10 INJECTION INTRAVENOUS
Status: DISCONTINUED | OUTPATIENT
Start: 2017-11-19 | End: 2017-11-20 | Stop reason: HOSPADM

## 2017-11-19 RX ADMIN — WARFARIN SODIUM 4 MG: 2 TABLET ORAL at 17:16

## 2017-11-19 RX ADMIN — CEFTRIAXONE 1 G: 1 INJECTION, SOLUTION INTRAVENOUS at 05:43

## 2017-11-19 RX ADMIN — IPRATROPIUM BROMIDE AND ALBUTEROL SULFATE 3 ML: .5; 3 SOLUTION RESPIRATORY (INHALATION) at 19:33

## 2017-11-19 RX ADMIN — METOPROLOL TARTRATE 75 MG: 50 TABLET ORAL at 20:57

## 2017-11-19 RX ADMIN — PANTOPRAZOLE SODIUM 40 MG: 40 TABLET, DELAYED RELEASE ORAL at 08:11

## 2017-11-19 RX ADMIN — AZITHROMYCIN 250 MG: 250 TABLET, FILM COATED ORAL at 08:10

## 2017-11-19 RX ADMIN — IPRATROPIUM BROMIDE AND ALBUTEROL SULFATE 3 ML: .5; 3 SOLUTION RESPIRATORY (INHALATION) at 03:56

## 2017-11-19 RX ADMIN — IPRATROPIUM BROMIDE AND ALBUTEROL SULFATE 3 ML: .5; 3 SOLUTION RESPIRATORY (INHALATION) at 11:06

## 2017-11-19 RX ADMIN — METOPROLOL TARTRATE 75 MG: 50 TABLET ORAL at 08:10

## 2017-11-19 RX ADMIN — IPRATROPIUM BROMIDE AND ALBUTEROL SULFATE 3 ML: .5; 3 SOLUTION RESPIRATORY (INHALATION) at 07:47

## 2017-11-19 RX ADMIN — FUROSEMIDE 80 MG: 40 TABLET ORAL at 08:10

## 2017-11-19 RX ADMIN — PREDNISONE 30 MG: 20 TABLET ORAL at 08:10

## 2017-11-19 RX ADMIN — IPRATROPIUM BROMIDE AND ALBUTEROL SULFATE 3 ML: .5; 3 SOLUTION RESPIRATORY (INHALATION) at 15:21

## 2017-11-19 NOTE — PLAN OF CARE
Problem: Patient Care Overview  Goal: Plan of Care/Patient Progress Review  Outcome: Improving  A&O x4, pleasant. Denies pain. VSS. Tele, SD occ paced. On 4 liters O2 per NC baseline. Sats in the mid 90's. Voiding in bathroom. Independent in room. PIV SL; on antibiotics. Ambulated halls this evening and took a shower. Possible discharge to home tomorrow. Will continue to monitor.

## 2017-11-19 NOTE — PROVIDER NOTIFICATION
Report of NSVT. Will place on K+ and Mag replacement protocols. Check Troponin x2 and TTE.     Saúl Gibson  [phone]

## 2017-11-19 NOTE — PLAN OF CARE
Problem: Patient Care Overview  Goal: Plan of Care/Patient Progress Review  A&O x4, pleasant. Denies pain. VSS. Tele, SDys & occ paced. On 4 liters O2 per NC; baseline. Sats in the mid 90's. Voiding in bathroom. Independent in room. PIV SL; on antibiotics. Possible discharge to home today.

## 2017-11-19 NOTE — PROVIDER NOTIFICATION
MD Notification    Notified Person:  MD    Notified Persons Name: Dr. Gibson    Notification Date/Time: 11/19/17 at 1710    Notification Interaction:  Paged    Purpose of Notification:  pt had an abnormal EKG earlier today and continues to have runs of tach, the most being 14 beats of tach. Do you want us to continue monitoring? And when should we notify you, V-tachs greater than what? Also, there is no order for tele, should I put one in.     Orders Received: ECHO and troponin tomorrow AM. Magnesium and potassium replacement protocol.

## 2017-11-19 NOTE — PROVIDER NOTIFICATION
MD Notification    Notified Person:  MD    Notified Persons Name: Arthur    Notification Date/Time: 11-    Notification Interaction:  Talked with Physician    Purpose of Notification: Abnormal tele    Orders Received: EKG ordered    Comments:

## 2017-11-19 NOTE — PLAN OF CARE
Problem: Patient Care Overview  Goal: Plan of Care/Patient Progress Review  Outcome: No Change  Pt is A/O x 4. Up independently in the room. VSS on 4L of oxygen via NC. Denies pain. LS diminished, clear. Dry non productive cough. No blood in the sputum reported. On scheduled Neb treatments. EKG today, see results. MD notified about abnormal EKG. Tele SD & occ paced. Continues on PO antibiotic.D/C pending. Nursing continue to monitor.

## 2017-11-19 NOTE — PROGRESS NOTES
[facility]    Hospitalist Progress Note    Date of Service (when I saw the patient): 11/19/2017    Assessment & Plan   Neda Dennis is a 87 year old female who was admitted on 11/15/2017. 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 Estrada.   Patient presented to the ER after acute onset shortness of breath. Admitted for further evaluation and treatment.    COPD exacerbation  Community-acquired Pneumonia, possible but felt unlikely.  Shortness of breath responded to nebulizer in emergency department. Afebrile. No elevation of WBC. Requiring 6L O2 on admission, increased from 4L baseline.   Chest x-ray noted pneumonia vs atelectasis in left lower lobe.   Started on rocephin and azithromycin for CAP of LLL.  Patient does not appear toxic however she does reports she is not quite back to her baseline respiratory status. Procalcitonin IS elevated which is suggestive of infection.  Continue PTA Advair. Duonebs ordered q 4 hours while awake with PRN albuterol nebs.  Prednisone 40 mg PO daily since 11/15. Reduced to 30 mg daily on 11/19/17. Will taper down by 10 mg every 3 days.   Continue to wean O2 as tolerated, noting baseline is 4L O2.  Patient is down to 4L at rest, but desats and feels dyspneic with exertion.     Blood-streaked sputum with elevated INR.  Known squamous cell carcinoma right upper lung, s/p radiation 2009 and followed with observation by Dr. Estrada.  Previous NSCLC right lower lobe status post wedge resection, 2008.  Later on 11/15, patient had small amount of blood streaked sputum with cough. No prior history of hemoptysis and  "no zhao blood or clots. INR goal 2-3, with patient noting it was elevated on last check 10 days PTA, as well. INR 3.63 on admission and increased to 5.07 on 11/16, likely affected by antibiotics. 2.74 on 11/17/17.  Pharmacy managing.  Patient has follow up with Dr. Estrada in early 2018. Dr. Disla discussed performing CT here on 11/16/17  and decided it would not  and patient prefers \"not to know\" what is going on with cancer.  Still reports small amounts of dark bloody sputum. IF she is still coughing up blood by Monday, would recommend we get a CT of the chest to further evaluate if lung cancer is growing (She reports she has a small tumor in left upper lobe) vs pneumonia causing some hemoptysis. She is now agreeable to possible chest imaging tomorrow. Her hgb is stable.       History of mitral valve repair, 2008.  History of chronic atrial fibrillation s/p pacemaker   Chronic anticoagulation with warfarin, goal 2-3.  Hypertension  Currently paced rhythm.  INR supratherapeutic outpatient per patient. Antibiotics contributing. Appreciate pharmacy assistance.   Continue PTA lasix and metoprolol.    Anemia  Supratherapeutic INR  Hgb 11.9 on admission. Patient has received some IV fluids with medications, but not any boluses.   Hgb has decreased to 9.7 gm/dl. Now rebounding. Small amount of blood-streaked sputum as above. Known history of lung cancer being monitored with observation and patient declined CT scan on 11/16. No significant amounts of bleeding as hgb has remained stable. Now more willing to undergo a chest cT. Recommend we pursue this if she is still having hemoptysis by Monday 11/20/17.   Restarted warfarin. Pharmacy monitoring.  Following Hgb.     DVT Prophylaxis: Warfarin  Code Status: DNR/ DNI    Disposition: Expected discharge likely in next 1+ day. PT eval suggests she can return home with intermittent family support.      Saúl Gibson MD  [phone]    Interval History   Feels " her breathing is almost back to her baseline. Hasn't noticed any hemoptysis yet this morning.     -Data reviewed today: I reviewed all new labs and imaging results over the last 24 hours. I personally reviewed chest x-ray.    Physical Exam   Temp: 97.7  F (36.5  C) Temp src: Oral BP: 128/51 Pulse: 67 Heart Rate: 66 Resp: 16 SpO2: 97 % O2 Device: Nasal cannula with humidification Oxygen Delivery: 4 LPM  Vitals:    11/15/17 0124 11/15/17 0603 11/16/17 0614   Weight: 61.2 kg (135 lb) 62.1 kg (136 lb 12.8 oz) 61.8 kg (136 lb 3.2 oz)     Vital Signs with Ranges  Temp:  [95.9  F (35.5  C)-97.7  F (36.5  C)] 97.7  F (36.5  C)  Pulse:  [67] 67  Heart Rate:  [62-88] 66  Resp:  [16] 16  BP: (115-147)/(51-67) 128/51  SpO2:  [92 %-99 %] 97 %  I/O last 3 completed shifts:  In: 120 [P.O.:120]  Out: -     Constitutional: Elderly woman. Alert and oriented x3. No acute distress. Sitting up with daughter at bedside.   Respiratory: No wheezing. Some faint crackles at left base.   Cardiovascular: Regular rate and rhythm.  GI: Soft, nontender.  Extremities: Chronic lower extremity skin changes. No gross deformities.  Neuro: CN II-XII intact    Medications     Warfarin Therapy Reminder       - MEDICATION INSTRUCTIONS -         sodium chloride (PF)  3 mL Intravenous Q8H     sodium chloride 0.9%  1,000 mL Intravenous Once     ipratropium - albuterol 0.5 mg/2.5 mg/3 mL  3 mL Nebulization Q4H     cefTRIAXone  1 g Intravenous Q24H     furosemide  80 mg Oral Daily     metoprolol (LOPRESSOR) tablet 75 mg  75 mg Oral BID     pantoprazole  40 mg Oral Daily     fluticasone-salmeterol  1 puff Inhalation Q12H       Data     Recent Labs  Lab 11/19/17  0642 11/18/17  0640 11/17/17  0718 11/16/17  0547  11/15/17  0133   WBC 5.9  --  11.1* 10.5  --  9.9   HGB 10.4*  --  10.4* 9.7*  < > 11.9   MCV 97  --  97 96  --  100     --  165 146*  --  186   INR 2.13* 2.37* 2.74* 5.07*  < >  --    NA  --   --  142 140  --  141   POTASSIUM  --   --  4.1 4.5   --  4.6   CHLORIDE  --   --  103 101  --  99   CO2  --   --  36* 37*  --  37*   BUN  --   --  52* 44*  --  31*   CR  --   --  1.32* 1.36*  --  1.17*   ANIONGAP  --   --  3 2*  --  5   JENNIE  --   --  9.3 9.0  --  9.6   GLC  --   --  116* 125*  --  160*   ALBUMIN  --   --   --   --   --  3.8   PROTTOTAL  --   --   --   --   --  8.1   BILITOTAL  --   --   --   --   --  0.6   ALKPHOS  --   --   --   --   --  72   ALT  --   --   --   --   --  15   AST  --   --   --   --   --  24   < > = values in this interval not displayed.    Imaging:  No results found for this or any previous visit (from the past 24 hour(s)).

## 2017-11-19 NOTE — PROGRESS NOTES
Patient is on a 4L NC with SpO2 in the mid to upper 90's. BS clear and diminished. All nebs were given as ordered.  Will cont to follow.  11/19/2017  Agnes Henao RRT

## 2017-11-19 NOTE — PROVIDER NOTIFICATION
MD Notification    Notified Person:  MD    Notified Persons Name: Arthur    Notification Date/Time:11-    Notification Interaction:  Web paged Physician    Purpose of Notification: Abnormal EKG results    Orders Received:    Comments:

## 2017-11-20 ENCOUNTER — APPOINTMENT (OUTPATIENT)
Dept: CARDIOLOGY | Facility: CLINIC | Age: 82
DRG: 190 | End: 2017-11-20
Attending: INTERNAL MEDICINE
Payer: MEDICARE

## 2017-11-20 ENCOUNTER — APPOINTMENT (OUTPATIENT)
Dept: CT IMAGING | Facility: CLINIC | Age: 82
DRG: 190 | End: 2017-11-20
Attending: INTERNAL MEDICINE
Payer: MEDICARE

## 2017-11-20 VITALS
WEIGHT: 136.2 LBS | BODY MASS INDEX: 24.13 KG/M2 | DIASTOLIC BLOOD PRESSURE: 70 MMHG | TEMPERATURE: 95.8 F | RESPIRATION RATE: 16 BRPM | HEIGHT: 63 IN | HEART RATE: 79 BPM | SYSTOLIC BLOOD PRESSURE: 143 MMHG | OXYGEN SATURATION: 98 %

## 2017-11-20 LAB
ANION GAP SERPL CALCULATED.3IONS-SCNC: 6 MMOL/L (ref 3–14)
BUN SERPL-MCNC: 45 MG/DL (ref 7–30)
CALCIUM SERPL-MCNC: 9 MG/DL (ref 8.5–10.1)
CHLORIDE SERPL-SCNC: 98 MMOL/L (ref 94–109)
CO2 SERPL-SCNC: 37 MMOL/L (ref 20–32)
CREAT SERPL-MCNC: 1.27 MG/DL (ref 0.52–1.04)
GFR SERPL CREATININE-BSD FRML MDRD: 40 ML/MIN/1.7M2
GLUCOSE SERPL-MCNC: 81 MG/DL (ref 70–99)
INR PPP: 1.93 (ref 0.86–1.14)
POTASSIUM SERPL-SCNC: 3.6 MMOL/L (ref 3.4–5.3)
SODIUM SERPL-SCNC: 141 MMOL/L (ref 133–144)
TROPONIN I SERPL-MCNC: 0.04 UG/L (ref 0–0.04)
TROPONIN I SERPL-MCNC: 0.05 UG/L (ref 0–0.04)

## 2017-11-20 PROCEDURE — 93306 TTE W/DOPPLER COMPLETE: CPT | Mod: 26 | Performed by: INTERNAL MEDICINE

## 2017-11-20 PROCEDURE — A9270 NON-COVERED ITEM OR SERVICE: HCPCS | Mod: GY | Performed by: INTERNAL MEDICINE

## 2017-11-20 PROCEDURE — 93306 TTE W/DOPPLER COMPLETE: CPT

## 2017-11-20 PROCEDURE — 40000275 ZZH STATISTIC RCP TIME EA 10 MIN

## 2017-11-20 PROCEDURE — 36415 COLL VENOUS BLD VENIPUNCTURE: CPT | Performed by: INTERNAL MEDICINE

## 2017-11-20 PROCEDURE — 80048 BASIC METABOLIC PNL TOTAL CA: CPT | Performed by: INTERNAL MEDICINE

## 2017-11-20 PROCEDURE — 25000128 H RX IP 250 OP 636: Performed by: INTERNAL MEDICINE

## 2017-11-20 PROCEDURE — 71250 CT THORAX DX C-: CPT

## 2017-11-20 PROCEDURE — 25000125 ZZHC RX 250: Performed by: INTERNAL MEDICINE

## 2017-11-20 PROCEDURE — 84484 ASSAY OF TROPONIN QUANT: CPT | Performed by: INTERNAL MEDICINE

## 2017-11-20 PROCEDURE — 94640 AIRWAY INHALATION TREATMENT: CPT

## 2017-11-20 PROCEDURE — 25000132 ZZH RX MED GY IP 250 OP 250 PS 637: Mod: GY | Performed by: INTERNAL MEDICINE

## 2017-11-20 PROCEDURE — 94640 AIRWAY INHALATION TREATMENT: CPT | Mod: 76

## 2017-11-20 PROCEDURE — 99239 HOSP IP/OBS DSCHRG MGMT >30: CPT | Performed by: INTERNAL MEDICINE

## 2017-11-20 PROCEDURE — 85610 PROTHROMBIN TIME: CPT | Performed by: INTERNAL MEDICINE

## 2017-11-20 RX ORDER — WARFARIN SODIUM 5 MG/1
5 TABLET ORAL
Status: COMPLETED | OUTPATIENT
Start: 2017-11-20 | End: 2017-11-20

## 2017-11-20 RX ORDER — WARFARIN SODIUM 4 MG/1
4 TABLET ORAL DAILY
Status: ON HOLD | COMMUNITY
Start: 2017-11-21 | End: 2018-09-28

## 2017-11-20 RX ADMIN — WARFARIN SODIUM 5 MG: 5 TABLET ORAL at 17:59

## 2017-11-20 RX ADMIN — IPRATROPIUM BROMIDE AND ALBUTEROL SULFATE 3 ML: .5; 3 SOLUTION RESPIRATORY (INHALATION) at 07:44

## 2017-11-20 RX ADMIN — METOPROLOL TARTRATE 75 MG: 50 TABLET ORAL at 08:03

## 2017-11-20 RX ADMIN — CEFTRIAXONE 1 G: 1 INJECTION, SOLUTION INTRAVENOUS at 05:04

## 2017-11-20 RX ADMIN — FUROSEMIDE 80 MG: 40 TABLET ORAL at 08:02

## 2017-11-20 RX ADMIN — PANTOPRAZOLE SODIUM 40 MG: 40 TABLET, DELAYED RELEASE ORAL at 08:03

## 2017-11-20 RX ADMIN — IPRATROPIUM BROMIDE AND ALBUTEROL SULFATE 3 ML: .5; 3 SOLUTION RESPIRATORY (INHALATION) at 15:33

## 2017-11-20 RX ADMIN — IPRATROPIUM BROMIDE AND ALBUTEROL SULFATE 3 ML: .5; 3 SOLUTION RESPIRATORY (INHALATION) at 10:57

## 2017-11-20 RX ADMIN — IPRATROPIUM BROMIDE AND ALBUTEROL SULFATE 3 ML: .5; 3 SOLUTION RESPIRATORY (INHALATION) at 00:11

## 2017-11-20 NOTE — PROGRESS NOTES
[facility]    Hospitalist Progress Note    Assessment & Plan   Neda Dennis is a 87 year old female who was admitted on 11/15/2017.       Neda Dennis is a 87 year old female who was admitted on 11/15/2017. 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 Estrada.   Patient presented to the ER after acute onset shortness of breath. Admitted for further evaluation and treatment.     COPD exacerbation  Community-acquired Pneumonia, possible but felt unlikely.  Shortness of breath responded to nebulizer in emergency department. Afebrile. No elevation of WBC. Requiring 6L O2 on admission, increased from 4L baseline., but now back to 4 L  Chest x-ray noted pneumonia vs atelectasis in left lower lobe.   Started on rocephin and azithromycin for CAP of LLL.  Patient does not appear toxic however she does reports she is not quite back to her baseline respiratory status. Procalcitonin IS elevated which is suggestive of infection.  Continue PTA Advair. Duonebs ordered q 4 hours while awake with PRN albuterol nebs.  Prednisone 40 mg PO daily since 11/15. Reduced to 30 mg daily on 11/19/17. Will taper down by 10 mg every 2 days  Follow-up with primary care physician w/in 7-10 days  Back to  baseline  Of 4L O2.  Pt feels that her breathing is back to normal  -has had 5 days of iv abx, will not discharge on oral antibiotics due to lack of purulent sputum, lack of fever, nl WBC.  -CT of chest today Mild bibasilar atelectasis and/or infiltrate.     Blood-streaked sputum with elevated INR.  Known squamous cell carcinoma right upper lung, s/p radiation 2009  "and followed with observation by Dr. Estrada.  Previous NSCLC right lower lobe status post wedge resection, 2008.  Later on 11/15, patient had small amount of blood streaked sputum with cough. No prior history of hemoptysis and no zhao blood or clots. INR goal 2-3, with patient noting it was elevated on last check 10 days PTA, as well. INR 3.63 on admission and increased to 5.07 on 11/16, likely affected by antibiotics. 2.74 on 11/17/17.  Pharmacy managing.  Patient has follow up with Dr. Estrada in early 2018. Dr. Disla discussed performing CT here on 11/16/17  and decided it would not  and patient prefers \"not to know\" what is going on with cancer.  Still reports small amounts of dark bloody sputum and had a small amt of blood again last evening  -CT of chest today  New 1.6 cm nodule at the left apex. This could be a nodular area of  apical fibrosis, versus a malignant lesion. Fibrotic changes around the right hilum are probably not  significantly changed since the comparison study  -pt to call Dr. Arce's office to schedule appointment  -copy of CT given to pt    History of mitral valve repair, 2008.  History of chronic atrial fibrillation s/p pacemaker   Chronic anticoagulation with warfarin, goal 2-3.  -pt usually takes 4 mg of coumadin  -today's INR 1.93  -pharmacy recommends 5 mg of coumadin  -will have pt take today's dose of coumadin today and then start 4 mg tomorrow  -INR later this week and [place] where she normally gets her INR's done  Hypertension  Currently paced rhythm.  INR supratherapeutic outpatient per patient. Antibiotics contributing. Appreciate pharmacy assistance.   Continue PTA lasix and metoprolol.    Demand ischemia  -pt asymptomatic, but has known coronary artery disease, as seen on CT of chest today  -she had some short run of SVT last evening  -troponin 0.50<0.38<0.032  -echo with There is mild to moderate concentric left ventricular hypertrophy.  The visual " ejection fraction is estimated at 50-55%.  E by E prime ratio is greater than 15, that likely suggests increased left  ventricular filling pressures.  There is a catheter/pacemaker lead seen in the right ventricle.  The right ventricle is moderately dilated.  Moderately decreased right ventricular systolic function  The right atrium is severely dilated.  The left atrium is mild to moderately dilated.  There is mild (1+) mitral regurgitation.  There is mod-severe to severe (3-4+) tricuspid regurgitation.  Dilated inferior vena cava  Mild valvular aortic stenosis.  Right ventricular systolic pressure is elevated, consistent with mild to  moderate pulmonary hypertension, but may be underestimated due to TR severity.  Compared to prior study, there is no significant change.  -pt is followed by [facility] and has an appointment with her device clinic in 2 days  -will have pt bring copy of echo report with her to the appointment       Anemia  Supratherapeutic INR  Hgb 11.9 on admission. Patient has received some IV fluids with medications, but not any boluses.   Hgb has decreased to 9.7 gm/dl. Now rebounding. Small amount of blood-streaked sputum as above. Known history of lung cancer being monitored with observation and patient declined CT scan on 11/16. No significant amounts of bleeding as hgb has remained stable.   Restarted warfarin. Pharmacy monitoring.  Following Hgb.      DVT Prophylaxis: Warfarin  Code Status: DNR/ DNI     Disposition: plan discharge home today      Miriam Martines MD    Interval History   Pt feels fine, feels back to her baseline.  Denies chest pain    -Data reviewed today: I reviewed all new labs and imaging results over the last 24 hours. I personally reviewed the chest CT image(s) showing new nodule at Left apex.    Physical Exam   Temp: 96.4  F (35.8  C) Temp src: Oral BP: 131/55 Pulse: 79 Heart Rate: 96 Resp: 16 SpO2: 97 % O2 Device: Nasal cannula Oxygen Delivery: 4 LPM  Vitals:     11/15/17 0124 11/15/17 0603 11/16/17 0614   Weight: 61.2 kg (135 lb) 62.1 kg (136 lb 12.8 oz) 61.8 kg (136 lb 3.2 oz)     Vital Signs with Ranges  Temp:  [96.4  F (35.8  C)-97.9  F (36.6  C)] 96.4  F (35.8  C)  Pulse:  [79-82] 79  Heart Rate:  [] 96  Resp:  [16] 16  BP: (127-138)/(55-75) 131/55  SpO2:  [95 %-98 %] 97 %  I/O last 3 completed shifts:  In: 480 [P.O.:480]  Out: -     Constitutional: alert   Respiratory: clear to auscultation, no wheezing or rhonchi   Cardiovascular: regular rate and rhythm without murmer or rub   GI:positive bowel sounds, non-tender   Skin/Integumen: no rashes    Other:        Medications     Warfarin Therapy Reminder       - MEDICATION INSTRUCTIONS -         warfarin  5 mg Oral ONCE at 18:00     sodium chloride (PF)  3 mL Intravenous Q8H     sodium chloride 0.9%  1,000 mL Intravenous Once     ipratropium - albuterol 0.5 mg/2.5 mg/3 mL  3 mL Nebulization Q4H     cefTRIAXone  1 g Intravenous Q24H     furosemide  80 mg Oral Daily     metoprolol (LOPRESSOR) tablet 75 mg  75 mg Oral BID     pantoprazole  40 mg Oral Daily     fluticasone-salmeterol  1 puff Inhalation Q12H       Data     Recent Labs  Lab 11/20/17  0700 11/20/17  0005 11/19/17  1755 11/19/17  0642 11/18/17  0640 11/17/17  0718 11/16/17  0547  11/15/17  0133   WBC  --   --   --  5.9  --  11.1* 10.5  --  9.9   HGB  --   --   --  10.4*  --  10.4* 9.7*  < > 11.9   MCV  --   --   --  97  --  97 96  --  100   PLT  --   --   --  167  --  165 146*  --  186   INR 1.93*  --   --  2.13* 2.37* 2.74* 5.07*  < >  --    NA  --   --   --   --   --  142 140  --  141   POTASSIUM  --   --   --   --   --  4.1 4.5  --  4.6   CHLORIDE  --   --   --   --   --  103 101  --  99   CO2  --   --   --   --   --  36* 37*  --  37*   BUN  --   --   --   --   --  52* 44*  --  31*   CR  --   --   --   --   --  1.32* 1.36*  --  1.17*   ANIONGAP  --   --   --   --   --  3 2*  --  5   JENNIE  --   --   --   --   --  9.3 9.0  --  9.6   GLC  --   --   --   --    --  116* 125*  --  160*   ALBUMIN  --   --   --   --   --   --   --   --  3.8   PROTTOTAL  --   --   --   --   --   --   --   --  8.1   BILITOTAL  --   --   --   --   --   --   --   --  0.6   ALKPHOS  --   --   --   --   --   --   --   --  72   ALT  --   --   --   --   --   --   --   --  15   AST  --   --   --   --   --   --   --   --  24   TROPI  --  0.038 0.032  --   --   --   --   --   --    < > = values in this interval not displayed.    No results found for this or any previous visit (from the past 24 hour(s)).

## 2017-11-20 NOTE — PLAN OF CARE
Problem: Patient Care Overview  Goal: Plan of Care/Patient Progress Review  Outcome: Improving  Pt is A/O x 4. Up independently in the room. VSS on 4L of oxygen via NC.  Tele: a fib with MD ALIA notified about runs of tach, MD ordered ECHO and troponin tomorrow AM and potassium and magnesium replacement protocol. Denies pain. LS diminished, fine crackles on LLL. Dry non productive cough. No blood in sputum today per pt. On scheduled Neb treatments. Continues on IV Rocephin. Pt ambulated halls and took a shower this evening. Plan for possible d/c tomorrow to home. Will continue to monitor.

## 2017-11-20 NOTE — PLAN OF CARE
Problem: Patient Care Overview  Goal: Plan of Care/Patient Progress Review  Outcome: Improving  Pt is A/O x4. VSS on 4L of oxygen via NC, baseline for patient. Denies pain. Tele: Afib with CVR. LS diminished, fine crackles on LLL. Dry, productive cough. Pt reported small amount of bloody sputum overnight. Independent in room, SBA in hallway. Plan for ECHO and troponin this AM and possible discharge home today. Will continue to monitor.

## 2017-11-20 NOTE — PLAN OF CARE
Problem: Patient Care Overview  Goal: Plan of Care/Patient Progress Review  Outcome: No Change  A&O, calls appropriately. Up ind in room, SBA in hallway, ambulates frequently. On 4L NC (baseline for pt). No bloody sputum this shift. Tele A fib with CVR. Plan for CT scan this afternoon and probable discharge to home afterwards. Continue to monitor.

## 2017-11-20 NOTE — PROGRESS NOTES
[facility] Progress Note  [facility]    Brief visit with pt per extended length of stay.  She awaiting word on when she may dc.  She talked about her worsening breathing problems, and the anxiety she experiences due to her shortness of breath.  She denied of SH support at this time.  SH team available per pt's need or request.                                                                                                                                                 Naheed Chu M.A.  Staff   Pager [phone]  Phone [phone]

## 2017-11-20 NOTE — PROGRESS NOTES
Pt. On 4L nasal canula, breath sound diminished neb. Tx. Given as scheduled will continue to follow.

## 2017-11-21 LAB — INTERPRETATION ECG - MUSE: NORMAL

## 2017-11-21 NOTE — DISCHARGE SUMMARY
DATE OF ADMISSION:  11/15/2017      DATE OF DISCHARGE:  11/20/2017      DISCHARGE DIAGNOSES:   1.  COPD exacerbation.   2.  Possible community-acquired pneumonia.   3.  Hemoptysis.   4.  History of squamous cell carcinoma, right upper lung, status post radiation in 2009.   5.  Previous non-small cell lung cancer, right lower lobe, status post wedge resection in 2008.   6.  History of mitral valve repair in 2008.   7.  History of chronic atrial fibrillation, status post pacemaker.   8.  History of chronic anticoagulation with warfarin, goal 2-3.   9.  Probable demand ischemia with a mild troponin elevation peak of 0.5.   10.  Supratherapeutic therapeutic INR on admission.      CODE STATUS:  DNR/DNI.      DISCHARGE MEDICATIONS:   New Medication:   1.  Ipratropium/albuterol inhaler 1 puff four times daily p.r.n. shortness of breath.   Medications Which May Have Changed:   2.  Protonix 40 mg b.i.d.   3.  Warfarin 4 mg daily, starting on 11/21/2017.   Medications Which Have Not Changed:   4.  Advair Diskus (250/50)1 one puff every 12 hours.   5.  Lasix 80 mg daily.   6.  Metoprolol 75 mg b.i.d.   7.  Potassium 20 mEq p.o. daily.   8.  Vitamin D 2000 units p.o. daily.      DISCHARGE INSTRUCTIONS:   1.  Neda Dennis is to follow up with primary care provider within 7-10 days for hospital follow-up.  Recommend a CBC at that time.   2.  Follow up with the [facility] at  in two days as scheduled.   3.  The patient is to talk with the [facility] about getting an appointment with her cardiologist to review the echocardiogram report, and also to evaluate mild troponin elevation during her hospitalization.   4.  The patient is to get an INR check this week at the [facility].   5.  The patient is to make an appointment with Dr. Estrada from [facility] to review report of her latest CT of the chest.   6.  The patient is going to take 5 mg of  Coumadin today before discharge, and then restart her usual dose of 4 mg daily, tomorrow, 11/21/2017.      PENDING RESULTS:  None.      PERTINENT LABS AND IMAGING STUDIES DURING THIS HOSPITALIZATION:   1.  The patient's labs on the day of discharge:  Sodium 141, potassium 3.6, chloride 98, bicarbonate 37, BUN 45, creatinine 1.27.  Troponin 0.032, 0.038, 0.050.  Her glucose was 81.   2.  CT of her chest 11/20/2017 showed a new 1.6 cm nodule left apex could be a nodular area of atypical fibrosis versus a malignant lesion.  Fibrotic changes around the right hilum are probably not significantly changed since the comparison study.  Marked cardiomegaly.  Stable benign adrenal adenomas bilaterally.  Mild bibasilar atelectasis and/or infiltrate.   3.  The patient had an echocardiogram 11/20/2017.  This showed the following:  Mild to moderate concentric LVH.  EF was 55-60%.  E/e prime showed greater than 15, likely suggesting increased LV filling pressure.  Catheter pacemaker seen in the right ventricle.  Right ventricle was moderately dilated.  Moderately decreased RV systolic function.  Right atrium was severely dilated.  Left atrium was mildly to moderately dilated.  Mild 1+ MR.  Moderate to severe to severe 3-4+ tricuspid regurgitation.  Dilated inferior vena cava.  Mild valvular aortic stenosis.  RV systolic pressure was elevated, consistent with mild to moderate pulmonary hypertension; may be underestimated due to TR severity.  Compared to prior study, no significant change.   4.  Labs on admission:  She had a creatinine of 1.17.  Her white count was 9.9, hemoglobin 11.9, platelet count 186,000.  The patient had a venous pH of 7.38, venous pCO2 of 55, venous O2 of 38, and bicarbonate of 38.  Procalcitonin 4.26.   5.  X-ray of the chest, 11/15/2017, showed left basilar infiltrate, atelectasis versus pneumonia, and cardiomegaly.      HOSPITAL COURSE:  Please see dictated history and physical for the patient's initial  presentation.   1.  Neda Dennis is an 87-year-old female with a history of COPD, on chronic 4 liters of oxygen.  She presented to the hospital with acute onset of shortness of breath over her baseline:  She was in the ER on 6 liters of oxygen, which was above her baseline of 4 liters.  Chest x-ray was consistent with a left lower lobe infiltrate.  She was started on ceftriaxone and azithromycin for pneumonia, and DuoNeb q. 4 hours.  She did have a short course of steroids, and her Advair inhaler was continued.  The patient had 5 days of IV ceftriaxone in addition.  She remained afebrile during this hospitalization.  The patient declined to be on a steroid taper when discharged, as she felt back to her baseline.  She was given a dual ipratropium/albuterol inhaler to use p.r.n. at home.   2.  History of lung cancer:  The patient has had surgery as well as radiation therapy.  She is followed by Dr. Estrada.  The patient did have some small blood coughed up during this hospitalization, and because of this, a CT of her chest was done, which did show a new left upper lobe nodule.  The patient will follow up with Dr. Estrada to discuss this.  She was given a copy of the CT report.   3.  History of mitral valve replacement.   4.  History of atrial fibrillation with pacemaker placement.  The patient will be discharged on warfarin.  She was supratherapeutic when she presented.   5.  Episode of supraventricular tachycardia:  The patient had no chest pain during this episode.  An echocardiogram was done, with results as above.  She also had serial troponins which were 0.03, 0.032, 0.038, and 0.050.  The patient has known coronary artery disease, as she has calcification of coronary arteries on CT of her chest.  She is followed at [facility], and she already has a cardiologist, and is going to see the Device Clinic on Wednesday, or two days after discharge.  I would like to have her follow up  with her cardiologist as an outpatient.      CODE STATUS:  DNR/DNI.      TIME FOR THIS DISCHARGE:  Due to multiple visits with the patient to discuss the plan about various problems, approximately 50 minutes.         LAVON BAUMAN MD             D: 2017 18:15   T: 2017 01:26   MT: PARRISH#[id number]      Name:     JESSIE EASTMAN   MRN:      [id number]        Account:        [id number]   :      [id number]           Admit Date:                                       Discharge Date: 2017      Document: [id number]       cc: [facility]        Kathy García MD

## 2017-11-21 NOTE — DISCHARGE SUMMARY
Patient discharged at 7:52 PM to home.  IV was discontinued. Pain at time of discharge was 0/10. Belongings returned to patient.  Discharge instructions and medications reviewed with patient.  Patient verbalized understanding and all questions were answered.  Prescriptions given to patient.  At time of discharge, patient condition was stable and left the unit on wheelchair escorted by DAYANA.

## 2017-11-28 ENCOUNTER — CARE COORDINATION (OUTPATIENT)
Dept: CARE COORDINATION | Facility: CLINIC | Age: 82
End: 2017-11-28

## 2017-11-28 NOTE — PROGRESS NOTES
Clinic Care Coordination Contact  [facility] Care Coordination Outreach    [facility]    Program Type: Pneumonia    Program Length: 30 days post discharge    Clinical Data:  Outreach Type: Initial post discharge    Priority 4 New Activation       Clinic Care Coordination Pneumonia Assessment:  Discharge:    Day of hospital discharge:  11/20/2017    Have the follow up appointments been scheduled? Yes    Is there a referral/need for Pulmonary Rehab? No COPD/purse-lipped breathing information mailed to the patient     Is the patient enrolled in [facility] Tele-Assurance program? Yes    Symptom Review:    How have you been feeling now that you are home?  tired    Are you having any increased shortness of breath? No    Are you having any fevers? No    When you cough, are you coughing up anything? No  Medications:     Were you started on any new medications?  No    Were any of your previous medications changed? No    Do you have all of your medications? Yes,     Are your medications effective in controlling your symptoms? Yes,     Are you currently on Prednisone (* does pt understand the tapering instructions)?  No    Medication reconciliation completed? Yes    Was MTM ? No    Oxygen/DME    Are you currently on oxygen? Yes     Is this new for you? No     Is it set up at home? Yes     What liter flow were you instructed to use and how often do you use it? 4 liters continuously     Review with patient how to use/maintain nebulizers and inhalers: patient reports she has not used her rescue inhaler since hospital discharge   Activity:    Have you had to reduce your activities because of dyspnea or other symptoms? Pacing activity     Were you instructed on how to cough and deep breathe? Yes, CC reviewed today.  Patient agrees to use Incentive Spirometer 4 times a day     Action: Actions: Advised for worsening symtoms, call MD, Clinical Status Review,  Education Provided, Medication Education/Counseling, Stable/No Action Required          Plan:  CC gave Angelia Li name for follow up variances  calls    RN CC will continue to monitor patients responses. Will plan to outreach as needed and with any new variances or concerns.     Sarahi Shepherd RN / Clinical Care Coordinator     [address] [address]  [email or web address]  Office :  [phone] / Fax :  [phone]

## 2017-12-08 ENCOUNTER — CARE COORDINATION (OUTPATIENT)
Dept: CARE COORDINATION | Facility: CLINIC | Age: 82
End: 2017-12-08

## 2017-12-08 NOTE — PROGRESS NOTES
Clinic Care Coordination Contact  [place] [facility] Care Coordination Outreach    Patient was enrolled in [facility] upon Discharged from: Hospital    Program Type: Pneumonia    Program Length: 30 days post discharge    Clinical Data:  Outreach Type: Variance follow up call     (Insert screen shot from [facility] if applicable)      Action: Actions: Left Voicemail                           Plan: RN CC will continue to monitor patients responses. Will plan to outreach as needed and with any new variances or concerns.

## 2017-12-14 ENCOUNTER — CARE COORDINATION (OUTPATIENT)
Dept: CARE COORDINATION | Facility: CLINIC | Age: 82
End: 2017-12-14

## 2017-12-14 NOTE — PROGRESS NOTES
Clinic Care Coordination Contact  [place] [facility] Care Coordination Outreach    Patient was enrolled in [facility] upon Discharged from: Hospital    Program Type: Pneumonia    Program Length: 30 days post discharge    Clinical Data:  Outreach Type: Variance follow up call     (Insert screen shot from [facility] if applicable)      Action: Actions: Left Voicemail                           Plan: RN CC will continue to monitor patients responses. Will plan to outreach as needed and with any new variances or concerns.

## 2018-02-23 ENCOUNTER — CARE COORDINATION (OUTPATIENT)
Dept: CARE COORDINATION | Facility: CLINIC | Age: 83
End: 2018-02-23

## 2018-09-25 ENCOUNTER — APPOINTMENT (OUTPATIENT)
Dept: GENERAL RADIOLOGY | Facility: CLINIC | Age: 83
DRG: 190 | End: 2018-09-25
Attending: EMERGENCY MEDICINE
Payer: MEDICARE

## 2018-09-25 ENCOUNTER — HOSPITAL ENCOUNTER (INPATIENT)
Facility: CLINIC | Age: 83
LOS: 3 days | Discharge: NURSING, REHAB OR LONG-TERM CARE | DRG: 190 | End: 2018-09-28
Attending: EMERGENCY MEDICINE | Admitting: HOSPITALIST
Payer: MEDICARE

## 2018-09-25 DIAGNOSIS — Z98.890 H/O MITRAL VALVE REPAIR: ICD-10-CM

## 2018-09-25 DIAGNOSIS — J44.1 COPD EXACERBATION (H): ICD-10-CM

## 2018-09-25 DIAGNOSIS — I48.91 ATRIAL FIBRILLATION, UNSPECIFIED TYPE (H): Primary | ICD-10-CM

## 2018-09-25 LAB
ANION GAP SERPL CALCULATED.3IONS-SCNC: 4 MMOL/L (ref 3–14)
BASE EXCESS BLDA CALC-SCNC: 8.4 MMOL/L
BASOPHILS # BLD AUTO: 0 10E9/L (ref 0–0.2)
BASOPHILS NFR BLD AUTO: 0.2 %
BUN SERPL-MCNC: 30 MG/DL (ref 7–30)
CALCIUM SERPL-MCNC: 9.1 MG/DL (ref 8.5–10.1)
CHLORIDE SERPL-SCNC: 103 MMOL/L (ref 94–109)
CO2 SERPL-SCNC: 37 MMOL/L (ref 20–32)
CREAT SERPL-MCNC: 1.38 MG/DL (ref 0.52–1.04)
DIFFERENTIAL METHOD BLD: ABNORMAL
EOSINOPHIL # BLD AUTO: 0.1 10E9/L (ref 0–0.7)
EOSINOPHIL NFR BLD AUTO: 0.7 %
ERYTHROCYTE [DISTWIDTH] IN BLOOD BY AUTOMATED COUNT: 15.6 % (ref 10–15)
GFR SERPL CREATININE-BSD FRML MDRD: 36 ML/MIN/1.7M2
GLUCOSE SERPL-MCNC: 178 MG/DL (ref 70–99)
HCO3 BLD-SCNC: 34 MMOL/L (ref 21–28)
HCT VFR BLD AUTO: 37.9 % (ref 35–47)
HGB BLD-MCNC: 11.7 G/DL (ref 11.7–15.7)
IMM GRANULOCYTES # BLD: 0 10E9/L (ref 0–0.4)
IMM GRANULOCYTES NFR BLD: 0.1 %
INR PPP: 3.26 (ref 0.86–1.14)
INTERPRETATION ECG - MUSE: NORMAL
LYMPHOCYTES # BLD AUTO: 1.2 10E9/L (ref 0.8–5.3)
LYMPHOCYTES NFR BLD AUTO: 13.4 %
MCH RBC QN AUTO: 31 PG (ref 26.5–33)
MCHC RBC AUTO-ENTMCNC: 30.9 G/DL (ref 31.5–36.5)
MCV RBC AUTO: 100 FL (ref 78–100)
MONOCYTES # BLD AUTO: 0.2 10E9/L (ref 0–1.3)
MONOCYTES NFR BLD AUTO: 2.6 %
NEUTROPHILS # BLD AUTO: 7.2 10E9/L (ref 1.6–8.3)
NEUTROPHILS NFR BLD AUTO: 83 %
NRBC # BLD AUTO: 0 10*3/UL
NRBC BLD AUTO-RTO: 0 /100
NT-PROBNP SERPL-MCNC: 2169 PG/ML (ref 0–1800)
O2/TOTAL GAS SETTING VFR VENT: ABNORMAL %
OXYHGB MFR BLD: 96 % (ref 92–100)
PCO2 BLD: 52 MM HG (ref 35–45)
PH BLD: 7.43 PH (ref 7.35–7.45)
PLATELET # BLD AUTO: 155 10E9/L (ref 150–450)
PO2 BLD: 88 MM HG (ref 80–105)
POTASSIUM SERPL-SCNC: 4 MMOL/L (ref 3.4–5.3)
PROCALCITONIN SERPL-MCNC: 5.61 NG/ML
RBC # BLD AUTO: 3.78 10E12/L (ref 3.8–5.2)
SODIUM SERPL-SCNC: 144 MMOL/L (ref 133–144)
TROPONIN I SERPL-MCNC: <0.015 UG/L (ref 0–0.04)
WBC # BLD AUTO: 8.7 10E9/L (ref 4–11)

## 2018-09-25 PROCEDURE — 71045 X-RAY EXAM CHEST 1 VIEW: CPT

## 2018-09-25 PROCEDURE — 94640 AIRWAY INHALATION TREATMENT: CPT

## 2018-09-25 PROCEDURE — 87040 BLOOD CULTURE FOR BACTERIA: CPT | Performed by: HOSPITALIST

## 2018-09-25 PROCEDURE — 85025 COMPLETE CBC W/AUTO DIFF WBC: CPT | Performed by: EMERGENCY MEDICINE

## 2018-09-25 PROCEDURE — 96365 THER/PROPH/DIAG IV INF INIT: CPT

## 2018-09-25 PROCEDURE — 99207 ZZC APP CREDIT; MD BILLING SHARED VISIT: CPT | Performed by: INTERNAL MEDICINE

## 2018-09-25 PROCEDURE — A9270 NON-COVERED ITEM OR SERVICE: HCPCS | Mod: GY | Performed by: INTERNAL MEDICINE

## 2018-09-25 PROCEDURE — 40000275 ZZH STATISTIC RCP TIME EA 10 MIN

## 2018-09-25 PROCEDURE — 84484 ASSAY OF TROPONIN QUANT: CPT | Performed by: EMERGENCY MEDICINE

## 2018-09-25 PROCEDURE — 99285 EMERGENCY DEPT VISIT HI MDM: CPT | Mod: 25

## 2018-09-25 PROCEDURE — 84145 PROCALCITONIN (PCT): CPT | Performed by: HOSPITALIST

## 2018-09-25 PROCEDURE — 36415 COLL VENOUS BLD VENIPUNCTURE: CPT | Performed by: HOSPITALIST

## 2018-09-25 PROCEDURE — 83880 ASSAY OF NATRIURETIC PEPTIDE: CPT | Performed by: EMERGENCY MEDICINE

## 2018-09-25 PROCEDURE — 96375 TX/PRO/DX INJ NEW DRUG ADDON: CPT

## 2018-09-25 PROCEDURE — 85610 PROTHROMBIN TIME: CPT | Performed by: EMERGENCY MEDICINE

## 2018-09-25 PROCEDURE — 25000128 H RX IP 250 OP 636: Performed by: EMERGENCY MEDICINE

## 2018-09-25 PROCEDURE — 94640 AIRWAY INHALATION TREATMENT: CPT | Mod: 76

## 2018-09-25 PROCEDURE — 40000886 ZZH STATISTIC STEP DOWN HRS DAY

## 2018-09-25 PROCEDURE — 99223 1ST HOSP IP/OBS HIGH 75: CPT | Mod: AI | Performed by: HOSPITALIST

## 2018-09-25 PROCEDURE — 25000125 ZZHC RX 250: Performed by: HOSPITALIST

## 2018-09-25 PROCEDURE — 80048 BASIC METABOLIC PNL TOTAL CA: CPT | Performed by: EMERGENCY MEDICINE

## 2018-09-25 PROCEDURE — 82805 BLOOD GASES W/O2 SATURATION: CPT | Performed by: HOSPITALIST

## 2018-09-25 PROCEDURE — 94660 CPAP INITIATION&MGMT: CPT

## 2018-09-25 PROCEDURE — 25000125 ZZHC RX 250: Performed by: EMERGENCY MEDICINE

## 2018-09-25 PROCEDURE — 36600 WITHDRAWAL OF ARTERIAL BLOOD: CPT

## 2018-09-25 PROCEDURE — 93005 ELECTROCARDIOGRAM TRACING: CPT

## 2018-09-25 PROCEDURE — 25000132 ZZH RX MED GY IP 250 OP 250 PS 637: Mod: GY | Performed by: INTERNAL MEDICINE

## 2018-09-25 PROCEDURE — 12000007 ZZH R&B INTERMEDIATE

## 2018-09-25 RX ORDER — LEVOFLOXACIN 5 MG/ML
750 INJECTION, SOLUTION INTRAVENOUS
Status: DISCONTINUED | OUTPATIENT
Start: 2018-09-27 | End: 2018-09-27

## 2018-09-25 RX ORDER — METHYLPREDNISOLONE SODIUM SUCCINATE 125 MG/2ML
60 INJECTION, POWDER, LYOPHILIZED, FOR SOLUTION INTRAMUSCULAR; INTRAVENOUS EVERY 24 HOURS
Status: COMPLETED | OUTPATIENT
Start: 2018-09-26 | End: 2018-09-26

## 2018-09-25 RX ORDER — IPRATROPIUM BROMIDE AND ALBUTEROL SULFATE 2.5; .5 MG/3ML; MG/3ML
3 SOLUTION RESPIRATORY (INHALATION) ONCE
Status: COMPLETED | OUTPATIENT
Start: 2018-09-25 | End: 2018-09-25

## 2018-09-25 RX ORDER — PANTOPRAZOLE SODIUM 40 MG/1
40 TABLET, DELAYED RELEASE ORAL
Status: DISCONTINUED | OUTPATIENT
Start: 2018-09-26 | End: 2018-09-28 | Stop reason: HOSPADM

## 2018-09-25 RX ORDER — POTASSIUM CHLORIDE 1.5 G/1.58G
20-40 POWDER, FOR SOLUTION ORAL
Status: DISCONTINUED | OUTPATIENT
Start: 2018-09-25 | End: 2018-09-28 | Stop reason: HOSPADM

## 2018-09-25 RX ORDER — LORAZEPAM 2 MG/ML
0.5 INJECTION INTRAMUSCULAR ONCE
Status: DISCONTINUED | OUTPATIENT
Start: 2018-09-25 | End: 2018-09-28 | Stop reason: HOSPADM

## 2018-09-25 RX ORDER — POTASSIUM CL/LIDO/0.9 % NACL 10MEQ/0.1L
10 INTRAVENOUS SOLUTION, PIGGYBACK (ML) INTRAVENOUS
Status: DISCONTINUED | OUTPATIENT
Start: 2018-09-25 | End: 2018-09-28 | Stop reason: HOSPADM

## 2018-09-25 RX ORDER — FUROSEMIDE 40 MG
80 TABLET ORAL DAILY
Status: DISCONTINUED | OUTPATIENT
Start: 2018-09-26 | End: 2018-09-26

## 2018-09-25 RX ORDER — METOPROLOL TARTRATE 1 MG/ML
2.5 INJECTION, SOLUTION INTRAVENOUS EVERY 4 HOURS PRN
Status: DISCONTINUED | OUTPATIENT
Start: 2018-09-25 | End: 2018-09-28 | Stop reason: HOSPADM

## 2018-09-25 RX ORDER — BISACODYL 10 MG
10 SUPPOSITORY, RECTAL RECTAL DAILY PRN
Status: DISCONTINUED | OUTPATIENT
Start: 2018-09-25 | End: 2018-09-28 | Stop reason: HOSPADM

## 2018-09-25 RX ORDER — LIDOCAINE 40 MG/G
CREAM TOPICAL
Status: DISCONTINUED | OUTPATIENT
Start: 2018-09-25 | End: 2018-09-25

## 2018-09-25 RX ORDER — METHYLPREDNISOLONE SODIUM SUCCINATE 125 MG/2ML
60 INJECTION, POWDER, LYOPHILIZED, FOR SOLUTION INTRAMUSCULAR; INTRAVENOUS EVERY 8 HOURS
Status: DISCONTINUED | OUTPATIENT
Start: 2018-09-25 | End: 2018-09-25

## 2018-09-25 RX ORDER — POTASSIUM CHLORIDE 7.45 MG/ML
10 INJECTION INTRAVENOUS
Status: DISCONTINUED | OUTPATIENT
Start: 2018-09-25 | End: 2018-09-28 | Stop reason: HOSPADM

## 2018-09-25 RX ORDER — NITROGLYCERIN 0.4 MG/1
0.4 TABLET SUBLINGUAL EVERY 5 MIN PRN
Status: DISCONTINUED | OUTPATIENT
Start: 2018-09-25 | End: 2018-09-28 | Stop reason: HOSPADM

## 2018-09-25 RX ORDER — POLYETHYLENE GLYCOL 3350 17 G/17G
17 POWDER, FOR SOLUTION ORAL DAILY PRN
Status: DISCONTINUED | OUTPATIENT
Start: 2018-09-25 | End: 2018-09-28 | Stop reason: HOSPADM

## 2018-09-25 RX ORDER — POTASSIUM CHLORIDE 1500 MG/1
20-40 TABLET, EXTENDED RELEASE ORAL
Status: DISCONTINUED | OUTPATIENT
Start: 2018-09-25 | End: 2018-09-28 | Stop reason: HOSPADM

## 2018-09-25 RX ORDER — METHYLPREDNISOLONE SODIUM SUCCINATE 125 MG/2ML
125 INJECTION, POWDER, LYOPHILIZED, FOR SOLUTION INTRAMUSCULAR; INTRAVENOUS ONCE
Status: COMPLETED | OUTPATIENT
Start: 2018-09-25 | End: 2018-09-25

## 2018-09-25 RX ORDER — ACETAMINOPHEN 325 MG/1
650 TABLET ORAL EVERY 4 HOURS PRN
Status: DISCONTINUED | OUTPATIENT
Start: 2018-09-25 | End: 2018-09-28 | Stop reason: HOSPADM

## 2018-09-25 RX ORDER — POTASSIUM CHLORIDE 1500 MG/1
20 TABLET, EXTENDED RELEASE ORAL DAILY
Status: DISCONTINUED | OUTPATIENT
Start: 2018-09-25 | End: 2018-09-26

## 2018-09-25 RX ORDER — ALBUTEROL SULFATE 0.83 MG/ML
3 SOLUTION RESPIRATORY (INHALATION)
Status: DISCONTINUED | OUTPATIENT
Start: 2018-09-25 | End: 2018-09-28 | Stop reason: HOSPADM

## 2018-09-25 RX ORDER — LEVOFLOXACIN 5 MG/ML
750 INJECTION, SOLUTION INTRAVENOUS ONCE
Status: COMPLETED | OUTPATIENT
Start: 2018-09-25 | End: 2018-09-25

## 2018-09-25 RX ORDER — ONDANSETRON 4 MG/1
4 TABLET, ORALLY DISINTEGRATING ORAL EVERY 6 HOURS PRN
Status: DISCONTINUED | OUTPATIENT
Start: 2018-09-25 | End: 2018-09-28 | Stop reason: HOSPADM

## 2018-09-25 RX ORDER — ONDANSETRON 2 MG/ML
4 INJECTION INTRAMUSCULAR; INTRAVENOUS EVERY 6 HOURS PRN
Status: DISCONTINUED | OUTPATIENT
Start: 2018-09-25 | End: 2018-09-28 | Stop reason: HOSPADM

## 2018-09-25 RX ORDER — IPRATROPIUM BROMIDE AND ALBUTEROL SULFATE 2.5; .5 MG/3ML; MG/3ML
3 SOLUTION RESPIRATORY (INHALATION) EVERY 4 HOURS
Status: DISCONTINUED | OUTPATIENT
Start: 2018-09-25 | End: 2018-09-28 | Stop reason: HOSPADM

## 2018-09-25 RX ORDER — NALOXONE HYDROCHLORIDE 0.4 MG/ML
.1-.4 INJECTION, SOLUTION INTRAMUSCULAR; INTRAVENOUS; SUBCUTANEOUS
Status: DISCONTINUED | OUTPATIENT
Start: 2018-09-25 | End: 2018-09-28 | Stop reason: HOSPADM

## 2018-09-25 RX ORDER — POTASSIUM CHLORIDE 29.8 MG/ML
20 INJECTION INTRAVENOUS
Status: DISCONTINUED | OUTPATIENT
Start: 2018-09-25 | End: 2018-09-25 | Stop reason: CLARIF

## 2018-09-25 RX ORDER — METOPROLOL TARTRATE 100 MG
100 TABLET ORAL DAILY
Status: DISCONTINUED | OUTPATIENT
Start: 2018-09-25 | End: 2018-09-25

## 2018-09-25 RX ORDER — MORPHINE SULFATE 2 MG/ML
1 INJECTION, SOLUTION INTRAMUSCULAR; INTRAVENOUS
Status: DISCONTINUED | OUTPATIENT
Start: 2018-09-25 | End: 2018-09-28 | Stop reason: HOSPADM

## 2018-09-25 RX ADMIN — LEVOFLOXACIN 750 MG: 5 INJECTION, SOLUTION INTRAVENOUS at 05:26

## 2018-09-25 RX ADMIN — UMECLIDINIUM 1 PUFF: 62.5 AEROSOL, POWDER ORAL at 15:18

## 2018-09-25 RX ADMIN — IPRATROPIUM BROMIDE AND ALBUTEROL SULFATE 3 ML: .5; 3 SOLUTION RESPIRATORY (INHALATION) at 04:50

## 2018-09-25 RX ADMIN — IPRATROPIUM BROMIDE AND ALBUTEROL SULFATE 3 ML: 2.5; .5 SOLUTION RESPIRATORY (INHALATION) at 10:49

## 2018-09-25 RX ADMIN — FLUTICASONE FUROATE AND VILANTEROL TRIFENATATE 1 PUFF: 100; 25 POWDER RESPIRATORY (INHALATION) at 15:18

## 2018-09-25 RX ADMIN — IPRATROPIUM BROMIDE AND ALBUTEROL SULFATE 3 ML: .5; 3 SOLUTION RESPIRATORY (INHALATION) at 05:02

## 2018-09-25 RX ADMIN — METHYLPREDNISOLONE SODIUM SUCCINATE 125 MG: 125 INJECTION, POWDER, FOR SOLUTION INTRAMUSCULAR; INTRAVENOUS at 04:55

## 2018-09-25 RX ADMIN — IPRATROPIUM BROMIDE AND ALBUTEROL SULFATE 3 ML: 2.5; .5 SOLUTION RESPIRATORY (INHALATION) at 09:03

## 2018-09-25 RX ADMIN — IPRATROPIUM BROMIDE AND ALBUTEROL SULFATE 3 ML: 2.5; .5 SOLUTION RESPIRATORY (INHALATION) at 16:10

## 2018-09-25 RX ADMIN — IPRATROPIUM BROMIDE AND ALBUTEROL SULFATE 3 ML: 2.5; .5 SOLUTION RESPIRATORY (INHALATION) at 19:50

## 2018-09-25 ASSESSMENT — ACTIVITIES OF DAILY LIVING (ADL)
ADLS_ACUITY_SCORE: 11

## 2018-09-25 ASSESSMENT — ENCOUNTER SYMPTOMS
COUGH: 1
CHEST TIGHTNESS: 1
FEVER: 0
SHORTNESS OF BREATH: 1
SORE THROAT: 0
WHEEZING: 1

## 2018-09-25 NOTE — H&P
Admitted:     09/25/2018      PRIMARY CARE PHYSICIAN:  Raj García MD      CHIEF COMPLAINT:  Dyspnea.      HISTORY OF PRESENT ILLNESS:  The patient is an 88-year-old female with a past medical history of COPD on chronic oxygen, pneumonia, squamous cell carcinoma, status post radiation and wedge resection, mitral valve repair, atrial fibrillation on Coumadin who presents to the emergency department from home with worsening dyspnea.  Difficult to get a full H and P from patient given patient is on continuous BiPAP.  She reports worsening dyspnea overnight.  She has noticed a cough over the last 1-2 days, difficult to tell if it is productive or not.  She reports as feeling like her prior COPD.  On arrival, the patient had diffuse wheezing and SATS in the mid 70s on 4 liters of her baseline oxygen.  She required immediate BiPAP and intervention with Solu-Medrol and DuoNebs.  This has improved her condition significantly.  She is feeling better.  She has continued to require BiPAP.  Patient is being admitted to [facility] [place] on BiPAP for COPD exacerbation.        PAST MEDICAL HISTORY:   1.  Advanced COPD on 4 liters of oxygen at baseline.   2.  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   3.  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   4.  Chronic atrial fibrillation, status post pacemaker, on chronic Coumadin.      PRIOR TO ADMIT MEDICATIONS:  Pending pharmacy review.   1.  Advair 250/50 one puff inhaled q.12 hours.   2.  Lasix 80 mg q. day.   3.  Combivent q.6 hours p.r.n. dyspnea.   4.  Metoprolol tartrate 75 mg b.i.d.   5.  Protonix 40 mg b.i.d.   6.  Potassium chloride 20 mEq once q. day.   7.  Vitamin D 2000 units q. day.   8.  Warfarin dosed p.r.n.      ALLERGIES:  CODEINE.      PAST SURGICAL HISTORY:     1.  Lobectomy of the lung.   2.  Mitral valve repair.      SOCIAL HISTORY:  History of smoking, otherwise lives at home.      FAMILY  HISTORY:  Reviewed and noncontributory.      REVIEW OF SYSTEMS:  Ten-point review of systems obtained to the best of our ability with her being on BiPAP, with pertinent positives and negatives per HPI, otherwise negative.      PHYSICAL EXAMINATION:   VITAL SIGNS:  Temperature 97.9, pulse 76, heart rate 81, respiratory rate 28, blood pressure 109/44, SATS are 99% on 4 liters of oxygen, currently on BiPAP.   CONSTITUTIONAL:  Patient lying in bed with BiPAP in place.  Resting generally comfortable and interacting with the BiPAP machine.   HEENT:  Pupils are equal, reactive to light.  Extraocular motors are intact.   PULMONARY:  Long expiratory phase, dyspneic with diffuse wheezing.   CARDIAC:  Normal S1, S2, distant heart sounds.   ABDOMEN:  Soft, nontender.   MUSCULOSKELETAL:  Range of motion intact in all extremities.   NEUROLOGIC:  Cranial nerves II-XII grossly intact.   PSYCHIATRIC:  Appropriate affect.      LABORATORY DATA:  Sodium 144, potassium 4.0, bicarbonate 37, BUN 30, creatinine 1.38.  WBC is 8.7, hemoglobin 11.7, platelets 155,000.  INR 3.26.  Chest x-ray, moderate sized region of patchy opacities in the mid and inferior right lung is nonspecific, could be infectious versus inflammatory.      EKG:  With significant amount of baseline wander, difficult to overall interpret but has atrial fibrillation baseline.      ASSESSMENT AND PLAN:  The patient is an 88-year-old female with past medical history of COPD on chronic oxygen, lung cancer status post radiation and lobectomy, mitral valve replacement, atrial fibrillation with pacemaker on chronic Coumadin, pulmonary hypertension and tricuspid regurgitation who presents with worsening dyspnea, suspected consistent with chronic obstructive pulmonary disease exacerbation.      1.  Chronic obstructive pulmonary disease exacerbation:  The patient has baseline oxygen levels of 4 with saturations in the mid 70s, now requiring BiPAP.  With intervention of steroids and  DuoNebs in the emergency department, the patient is feeling considerably better.  Suspect mostly COPD contribution given presentation and minimal vascular congestion on chest x-ray.  We will continue on BiPAP with COPD order set.  We will check an ABG on arrival.  Will add on blood cultures.  Will continue on Levaquin for now.  Add on a procalcitonin.  Difficult to tell if truly underlying bacterial infectious etiology, but suspected.  As the patient clears, may consider CT of the chest, but will hold for now given the patient is requiring BiPAP.  Will initiate RCAT and continue with scheduled DuoNebs and p.r.n. albuterol nebs.  Wean off of BiPAP as able.   2.  Pulmonary infiltrates:  Chest x-ray with notable infiltrates in the right lung which is in the area where she had prior carcinoma.  Will treat as pneumonia.  Obtain a procalcitonin and follow course.  Levaquin at 7:50 was given already today.  We will continue for now pending course.   3.  Atrial fibrillation on chronic Coumadin:  Patient is on warfarin which will have to hold given that she will be on BiPAP.  She is therapeutic currently.  Suspect she will be able to be weaned off of BiPAP and reinitiated on her warfarin.   4.  Hypertension:  Soft blood pressures at this time.  The patient has metoprolol ordered and with soft pressures will hold this being on BiPAP and in place will put on metoprolol IV p.r.n.  We will see how the course goes.  If need to reinitiate back on p.o. metoprolol or if needed, if unable to come off of the BiPAP then may need scheduled IV.  Also noted that patient was on Lasix prior to admit.  EF last check was last year with intact EF, but did notice some underlying tricuspid regurgitation which could be contributing in this case.  BNP is slightly elevated.  She is on Lasix prior to admit.  Unknown exact dosing at this time.  Given that this seems more COPD related will hold on scheduling p.o. or IV Lasix.  We will see how the  course throughout the day goes and if able to wean off of BiPAP, consideration of restarting on p.o. versus IV Lasix.   5.  Fluids, electrolytes and nutrition:  Electrolytes otherwise within normal limits.  Will hold prior to admit potassium chloride, given on BiPAP.  Will hold on fluids as well, as she is on Lasix prior to admit.   6.  Gastrointestinal prophylaxis:  Patient is on b.i.d. Protonix.  We will initiate an IV Protonix and advance back to p.o. as able.   7.  Deep venous thrombosis prophylaxis:  Patient is therapeutic at this time and will restart warfarin as noted above.      CODE STATUS:  DNR/DNI and this was discussed with the patient.  She wishes this to be continued.      DISPOSITION:  Inpatient.         TEAGAN BEY MD             D: 2018   T: 2018   MT: CC      Name:     JESSIE EASTMAN   MRN:      [id number]        Account:      [id number]   :      1929        Admitted:     2018                   Document: [id number]       cc: Raj García MD

## 2018-09-25 NOTE — IP AVS SNAPSHOT
` `     [place] [address] [facility]: [phone]                 INTERAGENCY TRANSFER FORM - NOTES (H&P, Discharge Summary, Consults, Procedures, Therapies)   2018                    Hospital Admission Date: 2018  JESSIE EASTMAN   : 1929  Sex: Female        Patient PCP Information     Provider PCP Type    Raj García MD General      History & Physicals     No notes of this type exist for this encounter.      Discharge Summaries     No notes of this type exist for this encounter.         Consult Notes      Consults by Allegra Garcia LICSW at 2018 12:16 PM     Author:  Allegra Garcia LICSW Service:  Social Work Author Type:      Filed:  2018  4:18 PM Date of Service:  2018 12:16 PM Creation Time:  2018 12:14 PM    Status:  Addendum :  Allegra Garcia LICSW ()     Consult Orders:    1. Care Transition RN/SW IP Consult [[id number]] ordered by Art Hoover MD at 18 1020                SW:     SW consulted as it is now recommended that pt discharge to TCU. RN Care coordinator met with pt previously to discuss home care and followed up today, pt voiced [facility] as her preferred facility. SW faxed referral via [facility]. Pt was admitted on 18 and does not have qualifying TCU stay until 18. Hospitalist to assess yet today, yesterday anticipated 1-2 more days. [facility] contacted this writer and facility is able to accept.[CL1.1]    ADDENDUM: Pt is anticipated to discharge tomorrow, 18, if medically appropriate. HOMERO updated [facility] Home admissions and there will be a bed available for pt 18.[CL1.2]    1615: Private room available for pt at [facility] per request, pt made aware of private room fee and is agreeable. Pt daughter will plan to transport around 1400 tomorrow. SW to contact daughter if any changes in plan occur in AM.[CL1.3]    MEGHANN Hayden LICSW  Daytime (8:00am-4:30pm):  [phone]  After-Hours  Pager (4:30pm-11:30pm): [phone][CL1.1]        Revision History        User Key Date/Time User Provider Type Action    > CL1.3 9/27/2018  4:18 PM Allegra Garcia LICSW  Addend     CL1.2 9/27/2018  2:38 PM Allegra Garcia LICSW  Addend     CL1.1 9/27/2018 12:16 PM Allegra Garcia LICSW  Sign            Consults by Gema Shine RN at 9/26/2018 11:30 AM     Author:  Gema Shine RN Service:  (none) Author Type:      Filed:  9/26/2018  3:11 PM Date of Service:  9/26/2018 11:30 AM Creation Time:  9/26/2018  3:07 PM    Status:  Signed :  Gema Shine RN ()     Consult Orders:    1. Care Coordinator IP Consult [[id number]] ordered by Kevin Sinclair MD at 09/25/18 0617                Care Transition Initial Assessment - RN        Met with: Patient and Family, daughter Lisa  DATA   Active Problems:    COPD exacerbation (H)       Cognitive Status: awake, alert and oriented.        Contact information and PCP information verified: Yes  Lives With: alone      Insurance concerns: No Insurance issues identified  ASSESSMENT  Patient currently receives the following services:  None, did have home care before        Identified issues/concerns regarding health management: she has questions for her MD regarding the antibiotics and regarding left neck gland pain    PLAN  Financial costs for the patient include per insurance .  Patient given options and choices for discharge yes, explained rational for C RN at discharge patient given choice and elected [facility] .  Patient/family is agreeable to the plan?  Yes:   Patient anticipates discharging to home .        Patient anticipates needs for home equipment: Yes, family will need to bring in portable oxygen tank for discharge.   Plan/Disposition: Home with resumption of home oxygen through [place] [facility] RN referral  through [facility].   Appointments:   To be arranged.     Care  (CTS) will continue to follow as needed.[JM1.1]               Revision History        User Key Date/Time User Provider Type Action    > JM1.1 9/26/2018  3:11 PM Gema Shine RN Case Manager Sign                     Progress Notes - Physician (Notes from 09/25/18 through 09/28/18)      Progress Notes by Gema Chicas RN at 9/28/2018  5:10 AM     Author:  Gema Chicas RN Service:  Nursing Author Type:  Registered Nurse    Filed:  9/28/2018  5:16 AM Date of Service:  9/28/2018  5:10 AM Creation Time:  9/28/2018  5:10 AM    Status:  Signed :  Gema Chicas RN (Registered Nurse)         A/O, VSS on 4L O2 per NC. LS dim, pt dyspneic on exertion. Denies pain. Plan for discontinue to TCU today.[JG1.1]     Revision History        User Key Date/Time User Provider Type Action    > JG1.1 9/28/2018  5:16 AM Gema Chicas RN Registered Nurse Sign            Progress Notes by Art Hoover MD at 9/27/2018  2:49 PM     Author:  Art Hoover MD Service:  Hospitalist Author Type:  Physician    Filed:  9/27/2018  3:00 PM Date of Service:  9/27/2018  2:49 PM Creation Time:  9/27/2018  2:49 PM    Status:  Signed :  Art Hoover MD (Physician)         [facility]    Hospitalist Progress Note    Assessment & Plan   88-year-old female with past medical history of COPD on chronic oxygen, lung cancer status post radiation and lobectomy, mitral valve replacement, atrial fibrillation with pacemaker on chronic Coumadin, pulmonary hypertension and tricuspid regurgitation who presents with worsening dyspnea, suspected consistent with chronic obstructive pulmonary disease exacerbation.       Chronic obstructive pulmonary disease acute exacerbation  Probable pneumonia  -Patient on home oxygen with baseline of 4 L, initially required BiPAP.    -Weaned off BiPAP currently on on 4 L via  nasal cannula which is her baseline.  -Continue prior to admission inhalers  -Duo nebs every 4 hour.   -Continue prednisone 40 mg daily  -Chest x-ray with notable infiltrates in the right lung which is in the area where she had prior carcinoma.    -Repeat chest x-ray in 1 month as outpatient  -[NB1.1]Switch[NB1.2] Levaquin to p.o. starting 9/28  -[NB1.1]Physical therapy recommending TCU[NB1.2]    Atrial fibrillation on chronic Coumadin:  -INR is still supratherapeutic[NB1.1], likely due to concomitant Levaquin use[NB1.2]  -pharmacy to dose Coumadin.  -[NB1.1]Continue prior to admission metoprolol at a reduced dose of 50 mg daily, adjust according to blood pressures.[NB1.2]    Hypertension:    -[NB1.1]Continue[NB1.2] metoprolol at 50 mg daily[NB1.1] for now[NB1.2]  -Hold Lasix[NB1.1] 1 more day, resume in the morning[NB1.2].    Chronic kidney disease stage III  -Baseline creatinine appears to be around 1.1-1.3  -Creatinine[NB1.1] was[NB1.2] marginally worse at 1.6[NB1.1] yesterday, improved today to 1.4.[NB1.2]    History of peptic ulcer disease:  -Continue prior to admission PPI    Left neck pain:  -She is endorsing pain in left upper neck over her left parotid gland since this morning, mildly tender to palpation  -No obvious parotid gland swelling[NB1.1]  -Resolved[NB1.2]    # Pain Assessment:  Current Pain Score 9/27/2018   Patient currently in pain? denies   Pain score (0-10) -   Pain location -   Pain descriptors -   Neda head pain level was assessed and she currently denies pain.        D/W: RN  DVT Prophylaxis: Warfarin  Code Status: DNR/DNI    Disposition: Expected discharge[NB1.1] 9/28, to TCU[NB1.2]  Art Hoover MD    Interval History   Dyspnea[NB1.1] continues to improve[NB1.2].[NB1.1]  L[NB1.2]eft-sided upper neck pain[NB1.1]-resolved[NB1.2].  Afebrile.      -Data reviewed today: I reviewed all new labs and imaging results over the last 24 hours. I personally reviewed no images or EKG's  today.      Physical Exam   Temp: 97.6  F (36.4  C) Temp src: Oral BP: 135/74 Pulse: 69 Heart Rate: 77 Resp: 16 SpO2: 95 % O2 Device: Nasal cannula Oxygen Delivery: 4 LPM  Vitals:    09/25/18 0800 09/27/18 0445   Weight: 62.6 kg (138 lb) 62.8 kg (138 lb 7.2 oz)     Vital Signs with Ranges  Temp:  [97.6  F (36.4  C)-98.2  F (36.8  C)] 97.6  F (36.4  C)  Pulse:  [61-69] 69  Heart Rate:  [69-80] 77  Resp:  [16] 16  BP: (109-135)/(58-74) 135/74  SpO2:  [93 %-97 %] 95 %  I/O last 3 completed shifts:  In: 760 [P.O.:760]  Out: -     Constitutional: AAOX3, NAD  HEENT: Moist oral mucosa, no oral lesions, No pallor or icterus  Neck-[NB1.1]no neck tenderness today.[NB1.2]  Respiratory:[NB1.1] Mixed at the bases bilaterally, no active wheezes, n[NB1.2]ormal WOB  Cardiovascular: RRR, No murmur  GI: Soft, Non- tender, BS- normoactive,   Skin/Integument: Warm and dry, no rashes  MSK: No joint deformity or swelling, trace edema  Neuro: CN- grossly intact        Medications       - MEDICATION INSTRUCTIONS -       Warfarin Therapy Reminder           fluticasone-vilanterol  1 puff Inhalation Daily     ipratropium - albuterol 0.5 mg/2.5 mg/3 mL  3 mL Nebulization Q4H     [START ON 9/28/2018] levofloxacin  500 mg Oral Daily     lidocaine         LORazepam  0.5 mg Intravenous Once     metoprolol tartrate (LOPRESSOR) tablet 50 mg  50 mg Oral Daily     pantoprazole  40 mg Oral QAM AC     predniSONE  40 mg Oral Daily     sodium chloride (PF)  3 mL Intracatheter Q8H     umeclidinium  1 puff Inhalation Daily     warfarin-No DOSE today  1 each Does not apply no dose today (warfarin)       Data       Recent Labs  Lab 09/27/18  0547 09/26/18  0550 09/25/18  0451   WBC 9.4 10.4 8.7   HGB 9.9* 9.8* 11.7   MCV 97 97 100   * 116* 155   INR 4.10* 4.59* 3.26*    146* 144   POTASSIUM 4.3 3.6 4.0   CHLORIDE 103 105 103   CO2 33* 35* 37*   BUN 52* 46* 30   CR 1.42* 1.61* 1.38*   ANIONGAP 6 6 4   JENNIE 9.0 8.8 9.1   * 119* 178*   TROPI   --   --  <0.015       No results found for this or any previous visit (from the past 24 hour(s)).[NB1.1]     Revision History        User Key Date/Time User Provider Type Action    > NB1.2 9/27/2018  3:00 PM Art Hoover MD Physician Sign     NB1.1 9/27/2018  2:49 PM Art Hoover MD Physician             ED Notes signed by Oksana Muniz-Provider at 9/27/2018 10:58 AM      Author:  Flavio Non-Provider Service:  (none) Author Type:  (none)    Filed:  9/27/2018 10:58 AM Date of Service:  9/27/2018 10:58 AM Creation Time:  9/27/2018 10:58 AM    Status:  Signed :  Scan, Non-Provider     Scan on 9/27/2018 10:58 AM by Radha MunizProvider : PREHOSPITAL CARE REPORT 1          Revision History        User Key Date/Time User Provider Type Action    > [N/A] 9/27/2018 10:58 AM Flavio Non-Provider (none) Sign            Progress Notes by Anni Chu PT at 9/27/2018 10:26 AM     Author:  Anni Chu PT Service:  Acute IP Rehab Author Type:  Physical Therapist    Filed:  9/27/2018 10:26 AM Date of Service:  9/27/2018 10:26 AM Creation Time:  9/27/2018 10:26 AM    Status:  Signed :  Anni Chu PT (Physical Therapist)          09/27/18 0933   Quick Adds   Type of Visit Initial PT Evaluation   Living Environment   Lives With alone   Living Arrangements house   Home Accessibility stairs to enter home;stairs within home;bed not on first floor   Number of Stairs to Enter Home 0   Number of Stairs Within Home 11   Stair Railings at Home inside, present on left side   Transportation Available car;family or friend will provide  (patient drives at baseline)   Living Environment Comment only uses steps when returning to bed   Self-Care   Usual Activity Tolerance moderate   Current Activity Tolerance fair   Regular Exercise no  (limited by breathing)   Equipment Currently Used at Home oxygen;none  (4L; has walker and wheelchair if needed)   Activity/Exercise/Self-Care Comment independent with mobility; gets help with  cleaning   Functional Level Prior   Ambulation 0-->independent   Transferring 0-->independent   Toileting 0-->independent   Bathing 0-->independent   Dressing 0-->independent   Eating 0-->independent   Communication 0-->understands/communicates without difficulty   Swallowing 0-->swallows foods/liquids without difficulty   Cognition 1 - attention or memory deficits   Fall history within last six months no   Which of the above functional risks had a recent onset or change? ambulation  (decreased activity tolerance)   Prior Functional Level Comment normally independent with mobility; limited to household primarily secondary to breathing   General Information   Onset of Illness/Injury or Date of Surgery - Date 09/25/18   Referring Physician Art Hoover MD   Patient/Family Goals Statement go to TCU at [facility]   Pertinent History of Current Problem (include personal factors and/or comorbidities that impact the POC) per chart: 88-year-old female with past medical history of COPD on chronic oxygen, lung cancer status post radiation and lobectomy, mitral valve replacement, atrial fibrillation with pacemaker on chronic Coumadin, pulmonary hypertension and tricuspid regurgitation who presents with worsening dyspnea, suspected consistent with chronic obstructive pulmonary disease exacerbation and probable pneumonia; see medical record for further information   Precautions/Limitations fall precautions;oxygen therapy device and L/min  (4L)   General Observations patient in bed; agreeable to PT eval   General Info Comments Activity: up with assist   Cognitive Status Examination   Orientation orientation to person, place and time   Level of Consciousness alert   Follows Commands and Answers Questions 100% of the time   Personal Safety and Judgment intact   Memory intact   Cognitive Comment appears intact during eval; reports some decline   Pain Assessment   Patient Currently in Pain No   Posture    Posture Comments WFL  "  Range of Motion (ROM)   ROM Comment WFL   Strength   Strength Comments functional weakness noted with mobility;  below baseline per patient report   Bed Mobility   Bed Mobility Comments SBA with HOB elevated and use of bedrail   Transfer Skills   Transfer Comments sit<>stand with CGA/SBA and safety cues; use of walker; reports knees feel weak   Gait   Gait Comments able to ambulate > 25 feet with CGA; feels she needs a walker currently secondary to LE's feeling weak   Balance   Balance Comments current need for a walker   Sensory Examination   Sensory Perception Comments intact   General Therapy Interventions   Planned Therapy Interventions gait training;strengthening;transfer training   Intervention Comments progress activity tolerance   Clinical Impression   Criteria for Skilled Therapeutic Intervention yes, treatment indicated   PT Diagnosis weakness; impaired transfers/gait   Influenced by the following impairments decreased activity tolerance; functional weakness; impaired balance   Functional limitations due to impairments impaired independence with functional mobility   Clinical Presentation Stable/Uncomplicated   Clinical Presentation Rationale min/CGA or less; lives alone; medically getting stable   Clinical Decision Making (Complexity) Low complexity   Therapy Frequency` daily   Predicted Duration of Therapy Intervention (days/wks) 3 days   Anticipated Equipment Needs at Discharge front wheeled walker   Anticipated Discharge Disposition Transitional Care Facility   Risk & Benefits of therapy have been explained Yes   Patient, Family & other staff in agreement with plan of care Yes   [facility] [employer] TM \"6 Clicks\"   2016, Trustees of [facility], under license to [employer].  All rights reserved.   6 Clicks Short Forms Basic Mobility Inpatient Short Form   [facility] [employer]  \"6 Clicks\" V.2 Basic Mobility Inpatient Short Form   1. Turning from your back to your side while in a flat " bed without using bedrails? 4 - None   2. Moving from lying on your back to sitting on the side of a flat bed without using bedrails? 3 - A Little   3. Moving to and from a bed to a chair (including a wheelchair)? 3 - A Little   4. Standing up from a chair using your arms (e.g., wheelchair, or bedside chair)? 3 - A Little   5. To walk in hospital room? 3 - A Little   6. Climbing 3-5 steps with a railing? 3 - A Little   Basic Mobility Raw Score (Score out of 24.Lower scores equate to lower levels of function) 19   Total Evaluation Time   Total Evaluation Time (Minutes) 10[SL1.1]        Revision History        User Key Date/Time User Provider Type Action    > SL1.1 9/27/2018 10:26 AM Anni Chu PT Physical Therapist Sign            Progress Notes by Usama Young RT at 9/26/2018  3:53 PM     Author:  Usama Young RT Service:  (none) Author Type:  Respiratory Therapist    Filed:  9/26/2018  3:53 PM Date of Service:  9/26/2018  3:53 PM Creation Time:  9/26/2018  3:53 PM    Status:  Signed :  Usama Young RT (Respiratory Therapist)         Nebs given as ordered. LS are clear and diminished t/o and pt is on 4L. Will continue to follow.  Usama Young[CL1.1]         Revision History        User Key Date/Time User Provider Type Action    > CL1.1 9/26/2018  3:53 PM Usama Young RT Respiratory Therapist Sign            Progress Notes by Art Hoover MD at 9/26/2018  1:44 PM     Author:  Art Hoover MD Service:  Hospitalist Author Type:  Physician    Filed:  9/26/2018  1:59 PM Date of Service:  9/26/2018  1:44 PM Creation Time:  9/26/2018  1:44 PM    Status:  Addendum :  Art Hoover MD (Physician)         [facility]    Hospitalist Progress Note    Assessment & Plan   88-year-old female with past medical history of COPD on chronic oxygen, lung cancer status post radiation and lobectomy, mitral valve replacement, atrial fibrillation with  pacemaker on chronic Coumadin, pulmonary hypertension and tricuspid regurgitation who presents with worsening dyspnea, suspected consistent with chronic obstructive pulmonary disease exacerbation.       Chronic obstructive pulmonary disease acute exacerbation  Probable pneumonia  -[NB1.1]Patient on home oxygen with baseline of 4 L,[NB1.2] initially required BiPAP.    -Weaned off BiPAP[NB1.1] currently on[NB1.2] on 4 L via nasal cannula which is her baseline.  -[NB1.1]Continue[NB1.2] prior to admission inhalers  -Duo nebs every 4 hour.[NB1.1]   -Change IV Solu-Medrol to prednisone 40 mg p.o. daily[NB1.3]  -Chest x-ray with notable infiltrates in the right lung which is in the area where she had prior carcinoma.[NB1.1]    -Repeat chest x-ray in 1 month as outpatient[NB1.2]  -continue on Levaquin for now    Atrial fibrillation on chronic Coumadin:  -INR is[NB1.1] still[NB1.2] supratherapeutic[NB1.1]   -[NB1.2]pharmacy to dose Coumadin.  -Hold prior to admission metoprolol given borderline blood pressures.    Hypertension:    -[NB1.1]Resume prior to admission metoprolol at 50 mg daily[NB1.2]  -[NB1.1]Hold Lasix for now due to slightly worsening creatinine.[NB1.2]    Chronic kidney disease stage III  -Baseline creatinine appears to be around 1.1-1.3[NB1.1]  -Creatinine marginally worse at 1.6 today, continue to hold Lasix, recheck creatinine in the morning.[NB1.2]    History of peptic ulcer disease:  -Continue prior to admission PPI[NB1.1]    Left neck pain:  -She is endorsing pain in left upper neck over her left parotid gland since this morning, mildly tender to palpation  -No obvious parotid gland swelling, continue to monitor for now, symptomatic treatment with Tylenol.[NB1.2]    # Pain Assessment:  Current Pain Score 9/26/2018   Patient currently in pain? denies   Pain score (0-10) -   Neda head pain level was assessed and she currently denies pain.        D/W: RN  DVT Prophylaxis: Warfarin  Code Status:  DNR/DNI    Disposition: Expected discharge in 1-2 days    Art Hoover MD    Interval History[NB1.1]   Dyspnea much improved.  Complaining of some left-sided upper neck pain.  Afebrile.  No earache, no fullness of the ear.  Denies pain in oral cavity or difficulty swallowing.[NB1.2]    -Data reviewed today: I reviewed all new labs and imaging results over the last 24 hours. I personally reviewed no images or EKG's today.      Physical Exam   Temp: 98.2  F (36.8  C) Temp src: Oral BP: 122/54 Pulse: 78 Heart Rate: 83 Resp: 20 SpO2: 95 % O2 Device: Nasal cannula Oxygen Delivery: 4 LPM  Vitals:    09/25/18 0800   Weight: 62.6 kg (138 lb)     Vital Signs with Ranges  Temp:  [97.7  F (36.5  C)-98.6  F (37  C)] 98.2  F (36.8  C)  Pulse:  [68-78] 78  Heart Rate:  [69-83] 83  Resp:  [18-22] 20  BP: ()/(45-62) 122/54  SpO2:  [93 %-96 %] 95 %  I/O last 3 completed shifts:  In: 510 [P.O.:510]  Out: 400 [Urine:400]    Constitutional: AAOX3, NAD  HEENT: Moist oral mucosa, no oral lesions, No pallor or icterus  Neck-[NB1.1]slightly tender over the left parotid gland, no obvious swelling[NB1.2]  Respiratory: Scattered wheeze bilaterally, Normal WOB  Cardiovascular: RRR, No murmur  GI: Soft, Non- tender, BS- normoactive,   Skin/Integument: Warm and dry, no rashes  MSK: No joint deformity or swelling, trace edema  Neuro: CN- grossly intact        Medications       - MEDICATION INSTRUCTIONS -       Warfarin Therapy Reminder           fluticasone-vilanterol  1 puff Inhalation Daily     ipratropium - albuterol 0.5 mg/2.5 mg/3 mL  3 mL Nebulization Q4H     [START ON 9/27/2018] levofloxacin  750 mg Intravenous Q48H     LORazepam  0.5 mg Intravenous Once     methylPREDNISolone  62.5 mg Intravenous Q24H     pantoprazole  40 mg Oral QAM AC     sodium chloride (PF)  3 mL Intracatheter Q8H     umeclidinium  1 puff Inhalation Daily     warfarin-No DOSE today  1 each Does not apply no dose today (warfarin)       Data       Recent  Labs  Lab 09/26/18  0550 09/25/18  0451   WBC 10.4 8.7   HGB 9.8* 11.7   MCV 97 100   * 155   INR 4.59* 3.26*   * 144   POTASSIUM 3.6 4.0   CHLORIDE 105 103   CO2 35* 37*   BUN 46* 30   CR 1.61* 1.38*   ANIONGAP 6 4   JENNIE 8.8 9.1   * 178*   TROPI  --  <0.015       No results found for this or any previous visit (from the past 24 hour(s)).[NB1.1]     Revision History        User Key Date/Time User Provider Type Action    > NB1.3 9/26/2018  1:59 PM Art Hoover MD Physician Addend     NB1.2 9/26/2018  1:57 PM Art Hoover MD Physician Sign     NB1.1 9/26/2018  1:44 PM Art Hoover MD Physician             Progress Notes by Patricia Schwartz RT at 9/26/2018 12:28 AM     Author:  Patricia Schwartz RT Service:  (none) Author Type:  Respiratory Therapist    Filed:  9/26/2018 12:32 AM Date of Service:  9/26/2018 12:28 AM Creation Time:  9/26/2018 12:28 AM    Status:  Signed :  Patricia Schwartz RT (Respiratory Therapist)         Pt refused BIPAP for tonight. Pt is stable on 4LNC, BBS clear, No shortness of breath reported per pt. schedule nebs given as ordered. Will continue to follow pt.[HA1.1]  9/26/2018  Patricia Schwartz[HA1.2]       Revision History        User Key Date/Time User Provider Type Action    > HA1.2 9/26/2018 12:32 AM Patricia Schwartz RT Respiratory Therapist Sign     HA1.1 9/26/2018 12:28 AM Patricia Schwartz RT Respiratory Therapist             Progress Notes by Art Hoover MD at 9/25/2018  9:10 AM     Author:  Art Hoover MD Service:  Hospitalist Author Type:  Physician    Filed:  9/25/2018  1:21 PM Date of Service:  9/25/2018  9:10 AM Creation Time:  9/25/2018  9:10 AM    Status:  Signed :  Art Hoover MD (Physician)         [facility]    Assessment & Plan   88-year-old female with past medical history of COPD on chronic oxygen, lung cancer status post radiation and lobectomy, mitral valve replacement,  atrial fibrillation with pacemaker on chronic Coumadin, pulmonary hypertension and tricuspid regurgitation who presents with worsening dyspnea, suspected consistent with chronic obstructive pulmonary disease exacerbation.       Chronic obstructive pulmonary disease[NB1.1] acute[NB1.2] exacerbation[NB1.1]  Probable pneumonia  -[NB1.2]The patient has baseline oxygen levels of 4 with saturations in the mid 70s,[NB1.1] and initially required[NB1.2] BiPAP.[NB1.1]    -Weaned off BiPAP this morning and now on 4 L via nasal cannula which is her baseline.  -Resume prior to admission Lasix from tomorrow morning.  -Sinew prior to admission inhalers  -Duo nebs every 4 hour.  -[NB1.2]Chest x-ray with notable infiltrates in the right lung which is in the area where she had prior carcinoma.[NB1.1]    -[NB1.2]continue on Levaquin for now    Atrial fibrillation on chronic Coumadin:[NB1.1]  -INR is slightly supratherapeutic, will consult pharmacy to dose Coumadin.  -Hold prior to admission metoprolol given borderline blood pressures.[NB1.2]    Hypertension:[NB1.1]    -Hold metoprolol as above.  -Anticipate resuming Lasix tomorrow morning.    Chronic kidney disease stage III  -Baseline creatinine appears to be around 1.1-1.3, at baseline    History of peptic ulcer disease:  -Continue prior to admission PPI[NB1.2]    # Pain Assessment:  Current Pain Score 11/20/2017   Patient currently in pain? denies   Pain score (0-10) -[NB1.1]   Neda head pain level was assessed and she currently denies pain.[NB1.2]        D/W: RN  DVT Prophylaxis:[NB1.1] Warfarin[NB1.2]  Code Status: DNR/DNI    Disposition: Expected discharge in[NB1.1] 1-2 days[NB1.2]    Art Hoover MD    Interval History[NB1.1]   Dyspnea much better.  Off BiPAP.  Afebrile.  Cough with scant sputum production[NB1.2]    -Data reviewed today: I reviewed all new labs and imaging results over the last 24 hours. I personally reviewed[NB1.1] no images or EKG's  today[NB1.2].      Physical Exam   Temp: 98.4  F (36.9  C) Temp src: Axillary BP: 102/48 Pulse: 76 Heart Rate: 69 Resp: 22 SpO2: 99 % O2 Device: BiPAP/CPAP    There were no vitals filed for this visit.  Vital Signs with Ranges  Temp:  [97.9  F (36.6  C)-98.4  F (36.9  C)] 98.4  F (36.9  C)  Pulse:  [76] 76  Heart Rate:  [66-81] 69  Resp:  [13-28] 22  BP: ()/() 102/48  SpO2:  [79 %-99 %] 99 %       Constitutional: AAOX3, NAD  HEENT: Moist oral mucosa, no oral lesions, No pallor or icterus  Neck- Supple, Good ROM, No JVD  Respiratory:[NB1.1] Scattered wheeze bilaterally[NB1.2], Normal WOB  Cardiovascular: RRR, No murmur  GI: Soft, Non- tender, BS- normoactive,   Skin/Integument: Warm and dry, no rashes  MSK: No joint deformity or swelling,[NB1.1] trace[NB1.2] edema  Neuro: CN- grossly intact        Medications       - MEDICATION INSTRUCTIONS -           ipratropium - albuterol 0.5 mg/2.5 mg/3 mL  3 mL Nebulization Q4H     [START ON 9/26/2018] levofloxacin  750 mg Intravenous Q24H     LORazepam  0.5 mg Intravenous Once     methylPREDNISolone  62.5 mg Intravenous Q8H     pantoprazole (PROTONIX) IV  40 mg Intravenous Daily with breakfast     sodium chloride (PF)  3 mL Intracatheter Q8H       Data       Recent Labs  Lab 09/25/18  0451   WBC 8.7   HGB 11.7         INR 3.26*      POTASSIUM 4.0   CHLORIDE 103   CO2 37*   BUN 30   CR 1.38*   ANIONGAP 4   JENNIE 9.1   *   TROPI <0.015       Recent Results (from the past 24 hour(s))   XR Chest Port 1 View    Narrative    CHEST SINGLE VIEW PORTABLE  9/25/2018 4:54 AM     HISTORY: Shortness of breath.    COMPARISON: 11/15/2017.    FINDINGS: A moderate-sized region of patchy opacities in the mid and  inferior right lung. A band-like opacity in the mid right lung is  again noted and likely represents scarring. The left lung is  relatively clear. Moderate to marked enlargement of the  cardiopericardial silhouette again noted.  "Atherosclerotic  calcification in the thoracic aorta. Prior median sternotomy. Left  anterior chest wall cardiac device with lead tips in the right atrium  and right ventricle.      Impression    IMPRESSION: A moderate-sized region of patchy opacities in the mid and  inferior right lung is nonspecific, but could be infectious or  inflammatory in etiology. A follow-up chest radiograph would be useful  in one month for reevaluation.    RAYSHAWN CASTANEDA MD[NB1.1]        Revision History        User Key Date/Time User Provider Type Action    > NB1.2 9/25/2018  1:21 PM Art Hoover MD Physician Sign     NB1.1 9/25/2018  9:10 AM Art Hoover MD Physician             ED Provider Notes by Libby Webb MD at 9/25/2018  4:45 AM     Author:  Libby Webb MD Service:  Emergency Medicine Author Type:  Physician    Filed:  9/25/2018  9:17 AM Date of Service:  9/25/2018  4:45 AM Creation Time:  9/25/2018  4:45 AM    Status:  Signed :  Libby Webb MD (Physician)           History     Chief Complaint:  Shortness of Breath    The history is provided by the patient and the EMS personnel.      Neda Dennis is an anticoagulated 88 year old female on warfarin, with a history of COPD, carcinoma of the lung, hypertension and atrial fibrillation who presents to the emergency department via EMS for evaluation of shortness of breath. Of note, the patient has known[GJ1.1] prior[MG1.1] lung carcinoma and COPD and is typically on 4L of O2 at all times. Earlier this morning the patient called EMS as she had been unable to fall asleep on her recliner. On arrival EMS notes that the patient was notably short of breath and that her \"lungs sounded tight with expiratory wheezes and minor crackles with a productive cough.\" The patient had O2 sats of 76% on 4L of O2 and was given a neb en route where her O2 sats increased to 88%. En route, they note that the patient had a blood sugar of 156 and " that she had otherwise stable vitals.     The patient arrived in the ED via EMS with a neb in place. She endorses increasing shortness of breath with productive cough that began this morning, but denies any chest pain or recent illness that may have provoked her known COPD. The patient reports that she is on blood thinners.     Allergies:  Codeine Sulfate  Hydromorphone     Medications:    Cholecalciferol  Epipen  Umeclidinium  Pantoprazole  Duoneb  Furosemide  Warfarin  Triamcinolone  Potassium Chloride  Metoprolol  Fluticasone-Salmeterol     Past Medical History:    Hypertension  Tobacco Dependence  Mitral Valve Regurgitation  Polycythemia  Hyperlipidemia  Lung Carcinoma  H Pylori  Atrial Fibrillation  Ulcers  Anemia  Peripheral Artery Disease  Aortic Stenosis  Osteopenia  Tricuspid Insufficiency  Arteriosclerotic Heart Disease  COPD    Past Surgical History:    Esophagoscopy, Gastroscopy, Duodenoscopy  Ovarian Cystectomy  Appendectomy  Mitral Valve Repair  Septal Closure  Lung Lobectomy    Family History:    No past pertinent family history.    Social History:  Marital Status:   [5]  Tobacco Use: Former  Alcohol Use: No    Review of Systems   Constitutional: Negative for fever.   HENT: Negative for sore throat.    Respiratory: Positive for cough, chest tightness, shortness of breath and wheezing.    Cardiovascular: Negative for chest pain.   All other systems reviewed and are negative.      Physical Exam[GJ1.1]     Patient Vitals for the past 24 hrs:   BP Temp Temp src Pulse Heart Rate Resp SpO2   09/25/18 0600 109/44 - - - 66 27 97 %   09/25/18 0545 118/51 - - - - 13 98 %   09/25/18 0530 137/64 - - - 70 27 96 %   09/25/18 0515 (!) 138/92 - - - 81 21 96 %   09/25/18 0500 (!) 135/101 97.9  F (36.6  C) Temporal - 75 27 92 %   09/25/18 0448 118/89 - - 76 - 28 (!) 79 %[GJ1.2]       Physical Exam  General:[GJ1.1] Appears uncomfortable sitting up in bed[MG1.1]  Eyes:  The pupils are equal and  round    Conjunctivae and sclerae are normal  ENT:    Moist mucous membranes  Neck:  Normal range of motion  CV:  Regular rate and rhythm    Skin warm and well perfused   Resp:  Lungs[GJ1.1] - bilateral expiratory wheezing bilaterally    Tachypnea    Increase in work of breathing[MG1.1]  GI:  Abdomen is soft, there is no rigidity    No distension    No rebound tenderness     No abdominal tenderness  MS:  Normal muscular tone[GJ1.1]. No leg swelling[MG1.1]  Skin:  No rash or acute skin lesions noted  Neuro:   Awake, alert.      Speech is normal and fluent.    Face is symmetric.     Moves all extremities equally  Psych:  Normal affect.  Appropriate interactions.      Emergency Department Course   ECG:[GJ1.1]  Indication: Shortness of Breath, COPD  Time: 0455  Vent. Rate 93 bpm. IN interval *. QRS duration 94. QT/QTc 358/445. P-R-T axis * -2 100. Undetermined rhythm. Incomplete right bundle branch block. Septal infarct, age undetermined. Abnormal ECG. No significant change compared to EKG dated 11/15/17. Read time: 0519[GJ1.3]    Imaging:[GJ1.1]  XR Portable Chest 1 view:   A moderate-sized region of patchy opacities in the mid and inferior right lung is nonspecific, but could be infectious or inflammatory in etiology. A follow-up chest radiograph would be useful in one month for reevaluation. As per radiology.[GJ1.3]     Laboratory:[GJ1.1]  CBC: WBC: 8.7, HGB: 11.7, PLT: 155  BMP: Glucose 178 (H), CO2: 37 (H), Creatinine: 1.38 (H), GFR: 36 (L), o/w WNL  BNP: 2169 (H)  INR: 3.26 (H)  Troponin I: <0.015[GJ1.3]    Interventions:[GJ1.1]  0450 Albuterol-Ipratropium 2.5mg-0.5mg/3ml, inhalation solution, Nebulizer[GJ1.3]  0455 Solumedrol 125 mg, IV[GJ1.1]  0502 Albuterol-Ipratropium 2.5mg-0.5mg/3ml, inhalation solution, Nebulizer  0526 Levaquin, 750 mg, IV[GJ1.3]    Emergency Department Course:    0442 Patient arrival in the ED via EMS  0444 I began my examination of the patient  0446 BiPAP administered by RT    IV inserted.  Medicine administered as documented above. Blood drawn. This was sent to the lab for further testing, results above.    The patient was sent for a[GJ1.1] chest xray[GJ1.3] while in the emergency department, findings above.[GJ1.1]     EKG obtained in the ED, see results above.     0527[GJ1.3] I rechecked the patient and discussed the results of her workup thus far.[GJ1.1] The patient reports that she is feeling improved and is breathing comfortably with BiPAP in place.     0542  I consulted with Dr. Sinclair of the hospitalist services. They are in agreement to accept the patient for admission.    Findings and plan explained to the patient and spouse who consent to admission. Discussed the patient with Dr. Sinclair, who will admit the patient to an [facility] bed for further monitoring, evaluation, and treatment.[GJ1.3]       Impression & Plan      Medical Decision Making:[GJ1.1]  Neda Dennis[MG1.2] is a 88 year old female who presented to the ED with shortness of breath. Concern for COPD exacerbation based on hx and exam. Patient evaluated immediately upon arrival to ED. Given her increase in work of breathing, patient placed on bipap on arrival to ED. This improved her work of breathing and on reevaluations, breathing much more comfortably. Given duonebs and solumedrol for presumed COPD exacerbation. Reports hx of CHF though do think that this is less likely. INR therapeutic, doubt PE. Doubt ACS with negative troponin, no chest pain. Xray chest shows opacity on right side. Given levaquin for presumed pneumonia. Will likely need CT chest in follow-up to rule out malignancy especially if area does not clear with antibiotics. Discussed with hospitalist for admission.[MG1.1]    Critical Care time:[GJ1.1]  was 30 minutes for this patient excluding procedures.[GJ1.2]    Diagnosis:[GJ1.1]    ICD-10-CM    1. COPD exacerbation (H) J44.1 INR[GJ1.2]       Disposition:[GJ1.1]  Admitted to Dr. Sinclair to an [facility] bed[GJ1.3]    Gauri  Disclosure:  I, Eduard Ernesto, am serving as a scribe on 9/25/2018 at 4:42 AM to personally document services performed by Libby Webb MD based on my observations and the provider's statements to me.     Eduard Orozco  9/25/2018    EMERGENCY DEPARTMENT[GJ1.1]       Libby Webb MD  09/25/18 0917  [MG1.3]     Revision History        User Key Date/Time User Provider Type Action    > MG1.3 9/25/2018  9:17 AM Libby Webb MD Physician Sign     MG1.2 9/25/2018  9:14 AM Libby Webb MD Physician      MG1.1 9/25/2018  9:13 AM Libby Webb MD Physician      GJ1.2 9/25/2018  6:21 AM Eduard Orozco Scribe Jayne     GJ1.3 9/25/2018  6:02 AM Eduard Orozco Scribe Share     GJ1.1 9/25/2018  5:02 AM Eduard Orozcoibdeysi Godoy            ED Notes by Marce Ayala RN at 9/25/2018  6:10 AM     Author:  Marce Ayala RN Service:  Nursing Author Type:  Registered Nurse    Filed:  9/25/2018  6:10 AM Date of Service:  9/25/2018  6:10 AM Creation Time:  9/25/2018  6:10 AM    Status:  Signed :  Marce Ayala RN (Registered Nurse)         Report given to DANGELO Benítez[MN1.1]     Revision History        User Key Date/Time User Provider Type Action    > MN1.1 9/25/2018  6:10 AM Marce Ayala RN Registered Nurse Sign            ED Notes by Marce Ayala RN at 9/25/2018  5:56 AM     Author:  Marce Ayala RN Service:  Nursing Author Type:  Registered Nurse    Filed:  9/25/2018  5:56 AM Date of Service:  9/25/2018  5:56 AM Creation Time:  9/25/2018  5:56 AM    Status:  Signed :  Marce Ayala RN (Registered Nurse)         No RN available to take pt at this time[MN1.1]     Revision History        User Key Date/Time User Provider Type Action    > MN1.1 9/25/2018  5:56 AM Marce Ayala, RN Registered Nurse Sign            ED Notes by Darleen Tanner, RN at 9/25/2018  4:50 AM     Author:  Darleen Tanner, RN  Service:  (none) Author Type:  Registered Nurse    Filed:  9/25/2018  4:50 AM Date of Service:  9/25/2018  4:50 AM Creation Time:  9/25/2018  4:50 AM    Status:  Signed :  Darleen Tanner RN (Registered Nurse)         Bed: ST03  Expected date:   Expected time:   Means of arrival:   Comments:  [age]F COPD/SOB     Revision History        User Key Date/Time User Provider Type Action    > AH1.1 9/25/2018  4:50 AM Darleen Tanner RN Registered Nurse Sign                  Procedure Notes     No notes of this type exist for this encounter.         Progress Notes - Therapies (Notes from 09/25/18 through 09/28/18)      Progress Notes by Anni Chu PT at 9/27/2018 10:26 AM     Author:  Anni Chu PT Service:  Acute IP Rehab Author Type:  Physical Therapist    Filed:  9/27/2018 10:26 AM Date of Service:  9/27/2018 10:26 AM Creation Time:  9/27/2018 10:26 AM    Status:  Signed :  Anni Chu PT (Physical Therapist)          09/27/18 0933   Quick Adds   Type of Visit Initial PT Evaluation   Living Environment   Lives With alone   Living Arrangements house   Home Accessibility stairs to enter home;stairs within home;bed not on first floor   Number of Stairs to Enter Home 0   Number of Stairs Within Home 11   Stair Railings at Home inside, present on left side   Transportation Available car;family or friend will provide  (patient drives at baseline)   Living Environment Comment only uses steps when returning to bed   Self-Care   Usual Activity Tolerance moderate   Current Activity Tolerance fair   Regular Exercise no  (limited by breathing)   Equipment Currently Used at Home oxygen;none  (4L; has walker and wheelchair if needed)   Activity/Exercise/Self-Care Comment independent with mobility; gets help with cleaning   Functional Level Prior   Ambulation 0-->independent   Transferring 0-->independent   Toileting 0-->independent   Bathing 0-->independent   Dressing 0-->independent   Eating 0-->independent    Communication 0-->understands/communicates without difficulty   Swallowing 0-->swallows foods/liquids without difficulty   Cognition 1 - attention or memory deficits   Fall history within last six months no   Which of the above functional risks had a recent onset or change? ambulation  (decreased activity tolerance)   Prior Functional Level Comment normally independent with mobility; limited to household primarily secondary to breathing   General Information   Onset of Illness/Injury or Date of Surgery - Date 09/25/18   Referring Physician Art Hoover MD   Patient/Family Goals Statement go to TCU at [facility]   Pertinent History of Current Problem (include personal factors and/or comorbidities that impact the POC) per chart: 88-year-old female with past medical history of COPD on chronic oxygen, lung cancer status post radiation and lobectomy, mitral valve replacement, atrial fibrillation with pacemaker on chronic Coumadin, pulmonary hypertension and tricuspid regurgitation who presents with worsening dyspnea, suspected consistent with chronic obstructive pulmonary disease exacerbation and probable pneumonia; see medical record for further information   Precautions/Limitations fall precautions;oxygen therapy device and L/min  (4L)   General Observations patient in bed; agreeable to PT eval   General Info Comments Activity: up with assist   Cognitive Status Examination   Orientation orientation to person, place and time   Level of Consciousness alert   Follows Commands and Answers Questions 100% of the time   Personal Safety and Judgment intact   Memory intact   Cognitive Comment appears intact during eval; reports some decline   Pain Assessment   Patient Currently in Pain No   Posture    Posture Comments WFL   Range of Motion (ROM)   ROM Comment WFL   Strength   Strength Comments functional weakness noted with mobility;  below baseline per patient report   Bed Mobility   Bed Mobility Comments SBA with HOB  "elevated and use of bedrail   Transfer Skills   Transfer Comments sit<>stand with CGA/SBA and safety cues; use of walker; reports knees feel weak   Gait   Gait Comments able to ambulate > 25 feet with CGA; feels she needs a walker currently secondary to LE's feeling weak   Balance   Balance Comments current need for a walker   Sensory Examination   Sensory Perception Comments intact   General Therapy Interventions   Planned Therapy Interventions gait training;strengthening;transfer training   Intervention Comments progress activity tolerance   Clinical Impression   Criteria for Skilled Therapeutic Intervention yes, treatment indicated   PT Diagnosis weakness; impaired transfers/gait   Influenced by the following impairments decreased activity tolerance; functional weakness; impaired balance   Functional limitations due to impairments impaired independence with functional mobility   Clinical Presentation Stable/Uncomplicated   Clinical Presentation Rationale min/CGA or less; lives alone; medically getting stable   Clinical Decision Making (Complexity) Low complexity   Therapy Frequency` daily   Predicted Duration of Therapy Intervention (days/wks) 3 days   Anticipated Equipment Needs at Discharge front wheeled walker   Anticipated Discharge Disposition Transitional Care Facility   Risk & Benefits of therapy have been explained Yes   Patient, Family & other staff in agreement with plan of care Yes   [facility] [employer] \"6 Clicks\"   2016, Trustees of [facility], under license to [employer].  All rights reserved.   6 Clicks Short Forms Basic Mobility Inpatient Short Form   [facility]  \"6 Clicks\" V.2 Basic Mobility Inpatient Short Form   1. Turning from your back to your side while in a flat bed without using bedrails? 4 - None   2. Moving from lying on your back to sitting on the side of a flat bed without using bedrails? 3 - A Little   3. Moving to and from a bed to a chair (including a " wheelchair)? 3 - A Little   4. Standing up from a chair using your arms (e.g., wheelchair, or bedside chair)? 3 - A Little   5. To walk in hospital room? 3 - A Little   6. Climbing 3-5 steps with a railing? 3 - A Little   Basic Mobility Raw Score (Score out of 24.Lower scores equate to lower levels of function) 19   Total Evaluation Time   Total Evaluation Time (Minutes) 10[SL1.1]        Revision History        User Key Date/Time User Provider Type Action    > SL1.1 9/27/2018 10:26 AM Anni Chu, PT Physical Therapist Sign            Progress Notes by Usama Young, RT at 9/26/2018  3:53 PM     Author:  Usama Young, RT Service:  (none) Author Type:  Respiratory Therapist    Filed:  9/26/2018  3:53 PM Date of Service:  9/26/2018  3:53 PM Creation Time:  9/26/2018  3:53 PM    Status:  Signed :  Usama Young RT (Respiratory Therapist)         Nebs given as ordered. LS are clear and diminished t/o and pt is on 4L. Will continue to follow.  Usmaa Young[CL1.1]         Revision History        User Key Date/Time User Provider Type Action    > CL1.1 9/26/2018  3:53 PM Usama Young, RT Respiratory Therapist Sign            Progress Notes by Patricia Schwartz RT at 9/26/2018 12:28 AM     Author:  Patricia Schwartz, RT Service:  (none) Author Type:  Respiratory Therapist    Filed:  9/26/2018 12:32 AM Date of Service:  9/26/2018 12:28 AM Creation Time:  9/26/2018 12:28 AM    Status:  Signed :  Patricia Schwartz, RT (Respiratory Therapist)         Pt refused BIPAP for tonight. Pt is stable on 4LNC, BBS clear, No shortness of breath reported per pt. schedule nebs given as ordered. Will continue to follow pt.[HA1.1]  9/26/2018  Patricia Schwartz[HA1.2]       Revision History        User Key Date/Time User Provider Type Action    > HA1.2 9/26/2018 12:32 AM Patricia Schwartz, RT Respiratory Therapist Sign     HA1.1 9/26/2018 12:28 AM Patricia Schwartz, RT Respiratory Therapist

## 2018-09-25 NOTE — PHARMACY-ADMISSION MEDICATION HISTORY
Admission medication history interview status for the 9/25/2018  admission is complete. See EPIC admission navigator for prior to admission medications     Medication history source reliability:Poor    Actions taken by pharmacist (provider contacted, etc): Patient was visited twice. Upon the first visit the patient was asleep, when re-visted, patient was on continuous BiPAP. She stated she did not know most of her medications and it was deemed appropriate to use pharmacy fill history data to verify prior to admission medications. Patient has switched from [facility] to [facility] for her prescription medications fills. Please use caution when ordering from this list.      Additional medication history information not noted on PTA med list : Patient was switched from advair to Breo recently which was last filled in June 2018. Of note, patient has been on warfarin 4mg daily-- unable to identify when the last dose was taken.     Medication reconciliation/reorder completed by provider prior to medication history? No    Time spent in this activity: 45 minutes    Prior to Admission medications    Medication Sig Last Dose Taking? Auth Provider   fluticasone-vilanterol (BREO ELLIPTA) 100-25 MCG/INH inhaler Inhale 1 puff into the lungs daily  Yes Unknown, Entered By History   fluticasone-salmeterol (ADVAIR) 250-50 MCG/DOSE diskus inhaler Inhale 1 puff into the lungs every 12 hours Unknown at Unknown time  Reported, Patient   furosemide (LASIX) 40 MG tablet Take 80 mg by mouth Unknown at Unknown time  Reported, Patient   METOPROLOL TARTRATE PO Take 100 mg by mouth daily  Unknown at Unknown time  Reported, Patient   pantoprazole (PROTONIX) 40 MG enteric coated tablet Take 1 tablet (40 mg) by mouth 2 times daily (before meals)  Patient taking differently: Take 40 mg by mouth daily  Unknown at Unknown time  Roger Riley,    potassium chloride (MICRO-K) 10 MEQ CR capsule Take 20 mEq by mouth daily   Unknown at Unknown time  Reported, Patient   umeclidinium (INCRUSE ELLIPTA) 62.5 MCG/INH inhaler Inhale 1 puff into the lungs daily Unknown at Unknown time  Unknown, Entered By History   VITAMIN D, CHOLECALCIFEROL, PO Take 2,000 Units by mouth daily Unknown at Unknown time  Unknown, Entered By History   warfarin (COUMADIN) 4 MG tablet Take 1 tablet (4 mg) by mouth daily Unknown at Unknown time  Miriam Martines MD

## 2018-09-25 NOTE — IP AVS SNAPSHOT
"` [place] [address] [facility]: [phone]                                              INTERAGENCY TRANSFER FORM - NURSING   2018                    Hospital Admission Date: 2018  JESSIE EASTMAN   : 1929  Sex: Female        Attending Provider: Kevin Sinclair MD     Allergies:  Codeine Sulfate    Infection:  None   Service:  INTERMEDIATE    Ht:  1.626 m (5' 4\")   Wt:  62.8 kg (138 lb 7.2 oz)   Admission Wt:  62.6 kg (138 lb)    BMI:  23.76 kg/m 2   BSA:  1.68 m 2            Patient PCP Information     Provider PCP Type    Raj García MD General      Current Code Status     Date Active Code Status Order ID Comments User Context       Prior      Code Status History     Date Active Date Inactive Code Status Order ID Comments User Context    2018 10:20 AM  DNR/DNI [id number]  Art Hoover MD Outpatient    2018  7:16 AM 2018 10:20 AM DNR/DNI [id number]  Kevin Sinclair MD Inpatient    2017  4:52 PM 2018  7:16 AM DNR/DNI [id number]  Miriam Martines MD Outpatient    11/15/2017  9:18 AM 2017  4:52 PM DNR/DNI [id number]  Sandra Disla MD Inpatient    2014 12:06 PM 11/15/2017  9:18 AM DNR/DNI [id number]  Jaqueline Jeffrey MD Outpatient    5/3/2014 11:25 AM 2014 12:06 PM DNR/DNI [id number]  Esteban Salvador MD Inpatient    2013  1:52 PM 5/3/2014 11:25 AM DNR [id number]  Briana Garzon MD Outpatient    2013  3:34 PM 2013  1:52 PM DNR [id number]  Grady Burgos MD Inpatient    2013  9:57 AM 2013  3:34 PM DNR [id number]  Roger Riley DO Outpatient    2013  2:24 AM 2013  9:57 AM DNR [id number]  Jackelin Billy RN Inpatient      Advance Directives        Scanned docmt in ACP Activity?           No scanned doc        Hospital Problems as of 2018              Priority Class Noted POA    COPD exacerbation (H) Medium  11/15/2017 Yes      Non-Hospital Problems " "as of 9/28/2018              Priority Class Noted    GI bleed Medium  9/4/2013    Transient cerebral ischemia Medium  11/8/2013    SOB (shortness of breath) Medium  5/3/2014    Pleural effusion Medium  5/3/2014    Lung nodules Medium  5/3/2014    Atrial fibrillation (H) Medium  5/3/2014    Lung cancer s/p resection 1/2009 Medium  5/3/2014    H/O mitral valve repair Medium  5/3/2014    Shortness of breath Medium  5/3/2014      Immunizations     None         END      ASSESSMENT     Discharge Profile Flowsheet     EXPECTED DISCHARGE     Patient's communication style  spoken language (English or Bilingual) 09/25/18 0450    Expected Discharge Date  09/28/18 (home) 09/25/18 0809   FINAL RESOURCES      DISCHARGE NEEDS ASSESSMENT     Resources List  Tele-Monitoring 12/14/17 1349    Equipment Currently Used at Home  oxygen;none (4L; has walker and wheelchair if needed) 09/27/18 0940   SKIN      Transportation Available  car;family or friend will provide (patient drives at baseline) 09/27/18 0940   Inspection of bony prominences  Full 09/28/18 1118    Equipment Used at Home  none 05/04/14 0950   Inspection under devices  Full 09/28/18 1118    GASTROINTESTINAL (ADULT,PEDIATRIC,OB)     Skin WDL  ex 09/28/18 1118    GI WDL  WDL 09/28/18 0910   Skin Color/Characteristics  dusky;vernon 09/28/18 1118    Last Bowel Movement  09/24/18 09/26/18 1811   SAFETY      Passing flatus  yes 09/28/18 0910   Safety WDL  WDL 09/28/18 1118    COMMUNICATION ASSESSMENT     All Alarms  alarm(s) activated and audible 09/28/18 1118                 Assessment WDL (Within Defined Limits) Definitions           Safety WDL     Effective: 09/28/15    Row Information: <b>WDL Definition:</b> Bed in low position, wheels locked; call light in reach; upper side rails up x 2; ID band on<br> <font color=\"gray\"><i>Item=AS safety wdl>>List=AS safety wdl>>Version=F14</i></font>      Skin WDL     Effective: 09/28/15    Row Information: <b>WDL Definition:</b> Warm; dry; " "intact; elastic; without discoloration; pressure points without redness<br> <font color=\"gray\"><i>Item=AS skin wdl>>List=AS skin wdl>>Version=F14</i></font>      Vitals     Vital Signs Flowsheet     VITAL SIGNS     Side Effects Monitoring: Respiratory Quality  R 09/28/18 0507    Temp  97.9  F (36.6  C) 09/28/18 0811   Side Effects Monitoring: Respiratory Depth  N 09/28/18 0507    Temp src  Oral 09/28/18 0811   Side Effects Monitoring: Sedation Level  S 09/28/18 0507    Resp  16 09/28/18 0811   HEIGHT AND WEIGHT      Pulse  67 09/28/18 0811   Height  1.626 m (5' 4\") 09/25/18 1041    Heart Rate  80 09/27/18 1932   Weight  62.8 kg (138 lb 7.2 oz) 09/28/18 0627    Pulse/Heart Rate Source  Monitor 09/27/18 1159   Weight Method  Standing scale 09/28/18 0627    BP  146/69 09/28/18 0811   BSA (Calculated - sq m)  1.68 09/25/18 1041    BP Location  Left arm 09/28/18 0403   BMI (Calculated)  23.74 09/25/18 1041    OXYGEN THERAPY     RESPIRATORY MONITORING      SpO2  97 % 09/28/18 1105   Respiratory Monitoring (EtCO2)  0 mmHg 09/25/18 0608    O2 Device  Nasal cannula 09/28/18 1105   EKG MONITORING      FiO2 (%)  80 % 09/25/18 1041   Cardiac Regularity  Irregular 09/25/18 0507    Oxygen Delivery  4 LPM 09/28/18 1105   Cardiac Rhythm  Junctional rhythm 09/25/18 0507    PAIN/COMFORT     POSITIONING      Patient Currently in Pain  denies 09/28/18 1032   Body Position  independently positioning 09/28/18 0910    Preferred Pain Scale  number (Numeric Rating Pain Scale) 09/26/18 1806   Head of Bed (HOB)  HOB at 20-30 degrees 09/28/18 0627    0-10 Pain Scale  1 09/26/18 1806   Positioning/Transfer Devices  pillows;in use 09/28/18 0910    Pain Location  Jaw 09/26/18 1806   Chair  Upright in chair 09/28/18 0910    Pain Orientation  Left 09/26/18 1806   DAILY CARE      Pain Descriptors  Sore;Dull 09/26/18 1806   Activity Management  ambulated to bathroom 09/28/18 1033    Pain Intervention(s)  Declines 09/26/18 1806   Activity Assistance " Provided  assistance, stand-by 09/28/18 1033    ANALGESIA SIDE EFFECTS MONITORING                   Patient Lines/Drains/Airways Status    Active LINES/DRAINS/AIRWAYS     Name: Placement date: Placement time: Site: Days: Last dressing change:    Peripheral IV 11/15/17 Left 11/15/17   0130      317     Peripheral IV 09/25/18 Right Upper forearm 09/25/18   0450   Upper forearm   3             Patient Lines/Drains/Airways Status    Active PICC/CVC     None            Intake/Output Detail Report     Date Intake   Output Net    Shift P.O. IV Piggyback Total Urine Total       Noc 09/26/18 2300 - 09/27/18 0659 60 -- 60 -- -- 60    Day 09/27/18 0700 - 09/27/18 1459 -- -- -- -- -- 0    Lynne 09/27/18 1500 - 09/27/18 2259 -- -- -- -- -- 0    Noc 09/27/18 2300 - 09/28/18 0659 -- -- -- -- -- 0    Day 09/28/18 0700 - 09/28/18 1459 210 -- 210 -- -- 210      Last Void/BM       Most Recent Value    Urine Occurrence 1 at 09/28/2018 1033    Stool Occurrence       Case Management/Discharge Planning     Case Management/Discharge Planning Flowsheet     LIVING ENVIRONMENT     Equipment Currently Used at Home  oxygen;none (4L; has walker and wheelchair if needed) 09/27/18 0940    Lives With  alone 09/27/18 0940   Resources List  Tele-Monitoring 12/14/17 1349    Living Arrangements  house 09/27/18 0940   ABUSE RISK SCREEN      COPING/STRESS     QUESTION TO PATIENT:  Has a member of your family or a partner(now or in the past) intimidated, hurt, manipulated, or controlled you in any way?  no 09/25/18 0459    Major Change/Loss/Stressor  denies 09/26/18 1049   QUESTION TO PATIENT: Do you feel safe going back to the place where you are living?  yes 09/25/18 0713    EXPECTED DISCHARGE     OBSERVATION: Is there reason to believe there has been maltreatment of a vulnerable adult (ie. Physical/Sexual/Emotional abuse, self neglect, lack of adequate food, shelter, medical care, or financial exploitation)?  no 09/25/18 0459    Expected Discharge Date   09/28/18 (home) 09/25/18 0809   OTHER      DISCHARGE PLANNING     Are you depressed or being treated for depression?  No 09/26/18 1053    Transportation Available  car;family or friend will provide (patient drives at baseline) 09/27/18 0940   HOMICIDE RISK      Equipment Used at Home  none 05/04/14 0950   Feels Like Hurting Others  no 09/25/18 0459    FINAL RESOURCES

## 2018-09-25 NOTE — IP AVS SNAPSHOT
[place] [address] [facility]    [address] [place] [address]    Phone:  [phone]                                       After Visit Summary   9/25/2018    Neda Dennis    MRN: [id number]           After Visit Summary Signature Page     I have received my discharge instructions, and my questions have been answered. I have discussed any challenges I see with this plan with the nurse or doctor.    ..........................................................................................................................................  Patient/Patient Representative Signature      ..........................................................................................................................................  Patient Representative Print Name and Relationship to Patient    ..................................................               ................................................  Date                                   Time    ..........................................................................................................................................  Reviewed by Signature/Title    ...................................................              ..............................................  Date                                               Time          22EPIC Rev 08/18

## 2018-09-25 NOTE — IP AVS SNAPSHOT
"[place] [address] [facility]: [phone]                                              INTERAGENCY TRANSFER FORM - PHYSICIAN ORDERS   2018                    Hospital Admission Date: 2018  JESSIE EASTMAN   : 1929  Sex: Female        Attending Provider: Kevin Sinclair MD     Allergies:  Codeine Sulfate    Infection:  None   Service:  INTERMEDIATE    Ht:  1.626 m (5' 4\")   Wt:  62.8 kg (138 lb 7.2 oz)   Admission Wt:  62.6 kg (138 lb)    BMI:  23.76 kg/m 2   BSA:  1.68 m 2            Patient PCP Information     Provider PCP Type    Raj García MD General      ED Clinical Impression     Diagnosis Description Comment Added By Time Added    COPD exacerbation (H) [J44.1] COPD exacerbation (H) [J44.1]  Libby Webb MD 2018  5:29 AM      Hospital Problems as of 2018              Priority Class Noted POA    COPD exacerbation (H) Medium  11/15/2017 Yes      Non-Hospital Problems as of 2018              Priority Class Noted    GI bleed Medium  2013    Transient cerebral ischemia Medium  2013    SOB (shortness of breath) Medium  5/3/2014    Pleural effusion Medium  5/3/2014    Lung nodules Medium  5/3/2014    Atrial fibrillation (H) Medium  5/3/2014    Lung cancer s/p resection 2009 Medium  5/3/2014    H/O mitral valve repair Medium  5/3/2014    Shortness of breath Medium  5/3/2014      Code Status History     Date Active Date Inactive Code Status Order ID Comments User Context    2018 10:20 AM  DNR/DNI [id number]  Art Hoover MD Outpatient    2018  7:16 AM 2018 10:20 AM DNR/DNI [id number]  Kevin Sinclair MD Inpatient    2017  4:52 PM 2018  7:16 AM DNR/DNI [id number]  Miriam Martines MD Outpatient    11/15/2017  9:18 AM 2017  4:52 PM DNR/DNI [id number]  Sandra Disla MD Inpatient    2014 12:06 PM 11/15/2017  9:18 AM DNR/DNI [id number]  Jaqueline Jeffrey MD Outpatient    5/3/2014 " 11:25 AM 5/5/2014 12:06 PM DNR/DNI [id number]  Esteban Salvador MD Inpatient    11/9/2013  1:52 PM 5/3/2014 11:25 AM DNR [id number]  Briana Garzon MD Outpatient    11/8/2013  3:34 PM 11/9/2013  1:52 PM DNR [id number]  Grady Burgos MD Inpatient    9/5/2013  9:57 AM 11/8/2013  3:34 PM DNR [id number]  Roger Riley DO Outpatient    9/4/2013  2:24 AM 9/5/2013  9:57 AM DNR [id number]  Jackelin Billy RN Inpatient         Medication Review      START taking        Dose / Directions Comments    levofloxacin 500 MG tablet   Commonly known as:  LEVAQUIN   Indication:  Community Acquired Pneumonia   Used for:  COPD exacerbation (H)        Dose:  500 mg   Start taking on:  9/29/2018   Take 1 tablet (500 mg) by mouth daily   Quantity:  4 tablet   Refills:  0        predniSONE 10 MG tablet   Commonly known as:  DELTASONE   Used for:  COPD exacerbation (H)        4 tabs daily for 3 days, then 3 tabs daily for 3 days, then 2 tabs daily for 3 days, then 1 tab daily for 3 days, then stop   Quantity:  30 tablet   Refills:  0          CONTINUE these medications which may have CHANGED, or have new prescriptions. If we are uncertain of the size of tablets/capsules you have at home, strength may be listed as something that might have changed.        Dose / Directions Comments    pantoprazole 40 MG EC tablet   Commonly known as:  PROTONIX   This may have changed:  when to take this   Used for:  Gastric ulcer        Dose:  40 mg   Take 1 tablet (40 mg) by mouth 2 times daily (before meals)   Quantity:  60 tablet   Refills:  3    BID x 4 weeks, then daily indefinitely **new time period       * warfarin 2 MG tablet   Commonly known as:  COUMADIN   This may have changed:  You were already taking a medication with the same name, and this prescription was added. Make sure you understand how and when to take each.   Used for:  Atrial fibrillation, unspecified type (H)        Dose:  2 mg   Take 1 tablet (2 mg) by mouth  once for 1 dose   Quantity:  30 tablet   Refills:  0        * warfarin 4 MG tablet   Commonly known as:  COUMADIN   This may have changed:  Another medication with the same name was added. Make sure you understand how and when to take each.   Used for:  H/O mitral valve repair        Dose:  4 mg   Start taking on:  9/29/2018   Take 1 tablet (4 mg) by mouth daily   Quantity:  30 tablet   Refills:  0        * Notice:  This list has 2 medication(s) that are the same as other medications prescribed for you. Read the directions carefully, and ask your doctor or other care provider to review them with you.      CONTINUE these medications which have NOT CHANGED        Dose / Directions Comments    fluticasone-salmeterol 250-50 MCG/DOSE diskus inhaler   Commonly known as:  ADVAIR   Notes to Patient:  Resume per pt        Dose:  1 puff   Inhale 1 puff into the lungs every 12 hours   Refills:  0        fluticasone-vilanterol 100-25 MCG/INH inhaler   Commonly known as:  BREO ELLIPTA        Dose:  1 puff   Inhale 1 puff into the lungs daily   Refills:  0        furosemide 40 MG tablet   Commonly known as:  LASIX   Notes to Patient:  Resume per pt        Dose:  80 mg   Take 80 mg by mouth   Refills:  0        METOPROLOL TARTRATE PO        Dose:  100 mg   Take 100 mg by mouth daily   Refills:  0        potassium chloride 10 MEQ CR capsule   Commonly known as:  MICRO-K   Notes to Patient:  Resume per pt        Dose:  20 mEq   Take 20 mEq by mouth daily   Refills:  0        umeclidinium 62.5 MCG/INH inhaler   Commonly known as:  INCRUSE ELLIPTA        Dose:  1 puff   Inhale 1 puff into the lungs daily   Refills:  0        VITAMIN D (CHOLECALCIFEROL) PO   Notes to Patient:  Resume per pt        Dose:  2000 Units   Take 2,000 Units by mouth daily   Refills:  0                  Further instructions from your care team       You are being discharged to a transitional care unit:  [facility]  Phone: [phone]        After Care      Activity - Up with nursing assistance           Advance Diet as Tolerated       Follow this diet upon discharge: Orders Placed This Encounter      Regular Diet Adult         Fall precautions           General info for SNF       Length of Stay Estimate: Short Term Care: Estimated # of Days <30  Condition at Discharge: Improving  Level of care:skilled   Rehabilitation Potential: Good  Admission H&P remains valid and up-to-date: Yes  Recent Chemotherapy: N/A  Use Nursing Home Standing Orders: N/A       Mantoux instructions       Give two-step Mantoux (PPD) Per Facility Policy Yes             Procedures     Oxygen - Nasal cannula       4 Lpm by nasal cannula to keep O2 sats 92% or greater.             Referrals     Occupational Therapy Adult Consult       Evaluate and treat as clinically indicated.    Reason:       Physical Therapy Adult Consult       Evaluate and treat as clinically indicated.    Reason:             Follow-Up Appointment Instructions     Future Labs/Procedures    Follow Up and recommended labs and tests     Comments:    Follow up with detention physician.  The following labs/tests are recommended: INR on 9/29. CBC and BMP in 1 week      Follow-Up Appointment Instructions     Follow Up and recommended labs and tests       Follow up with detention physician.  The following labs/tests are recommended: INR on 9/29. CBC and BMP in 1 week             Statement of Approval     Ordered          09/28/18 1020  I have reviewed and agree with all the recommendations and orders detailed in this document.  EFFECTIVE NOW     Approved and electronically signed by:  Art Hoover MD

## 2018-09-25 NOTE — PROGRESS NOTES
[facility]    Hospitalist Progress Note    Assessment & Plan   88-year-old female with past medical history of COPD on chronic oxygen, lung cancer status post radiation and lobectomy, mitral valve replacement, atrial fibrillation with pacemaker on chronic Coumadin, pulmonary hypertension and tricuspid regurgitation who presents with worsening dyspnea, suspected consistent with chronic obstructive pulmonary disease exacerbation.       Chronic obstructive pulmonary disease acute exacerbation  Probable pneumonia  -The patient has baseline oxygen levels of 4 with saturations in the mid 70s, and initially required BiPAP.    -Weaned off BiPAP this morning and now on 4 L via nasal cannula which is her baseline.  -Resume prior to admission Lasix from tomorrow morning.  -Sinew prior to admission inhalers  -Duo nebs every 4 hour.  -Chest x-ray with notable infiltrates in the right lung which is in the area where she had prior carcinoma.    -continue on Levaquin for now    Atrial fibrillation on chronic Coumadin:  -INR is slightly supratherapeutic, will consult pharmacy to dose Coumadin.  -Hold prior to admission metoprolol given borderline blood pressures.    Hypertension:    -Hold metoprolol as above.  -Anticipate resuming Lasix tomorrow morning.    Chronic kidney disease stage III  -Baseline creatinine appears to be around 1.1-1.3, at baseline    History of peptic ulcer disease:  -Continue prior to admission PPI    # Pain Assessment:  Current Pain Score 11/20/2017   Patient currently in pain? denies   Pain score (0-10) -   Neda head pain level was assessed and she currently denies pain.        D/W: RN  DVT Prophylaxis: Warfarin  Code Status: DNR/DNI    Disposition: Expected discharge in 1-2 days    Art Hoover MD    Interval History   Dyspnea much better.  Off BiPAP.  Afebrile.  Cough with scant sputum production    -Data reviewed today: I reviewed all new labs and imaging results over the last 24  hours. I personally reviewed no images or EKG's today.      Physical Exam   Temp: 98.4  F (36.9  C) Temp src: Axillary BP: 102/48 Pulse: 76 Heart Rate: 69 Resp: 22 SpO2: 99 % O2 Device: BiPAP/CPAP    There were no vitals filed for this visit.  Vital Signs with Ranges  Temp:  [97.9  F (36.6  C)-98.4  F (36.9  C)] 98.4  F (36.9  C)  Pulse:  [76] 76  Heart Rate:  [66-81] 69  Resp:  [13-28] 22  BP: ()/() 102/48  SpO2:  [79 %-99 %] 99 %       Constitutional: AAOX3, NAD  HEENT: Moist oral mucosa, no oral lesions, No pallor or icterus  Neck- Supple, Good ROM, No JVD  Respiratory: Scattered wheeze bilaterally, Normal WOB  Cardiovascular: RRR, No murmur  GI: Soft, Non- tender, BS- normoactive,   Skin/Integument: Warm and dry, no rashes  MSK: No joint deformity or swelling, trace edema  Neuro: CN- grossly intact        Medications       - MEDICATION INSTRUCTIONS -           ipratropium - albuterol 0.5 mg/2.5 mg/3 mL  3 mL Nebulization Q4H     [START ON 9/26/2018] levofloxacin  750 mg Intravenous Q24H     LORazepam  0.5 mg Intravenous Once     methylPREDNISolone  62.5 mg Intravenous Q8H     pantoprazole (PROTONIX) IV  40 mg Intravenous Daily with breakfast     sodium chloride (PF)  3 mL Intracatheter Q8H       Data       Recent Labs  Lab 09/25/18  0451   WBC 8.7   HGB 11.7         INR 3.26*      POTASSIUM 4.0   CHLORIDE 103   CO2 37*   BUN 30   CR 1.38*   ANIONGAP 4   JENNIE 9.1   *   TROPI <0.015       Recent Results (from the past 24 hour(s))   XR Chest Port 1 View    Narrative    CHEST SINGLE VIEW PORTABLE  9/25/2018 4:54 AM     HISTORY: Shortness of breath.    COMPARISON: 11/15/2017.    FINDINGS: A moderate-sized region of patchy opacities in the mid and  inferior right lung. A band-like opacity in the mid right lung is  again noted and likely represents scarring. The left lung is  relatively clear. Moderate to marked enlargement of the  cardiopericardial silhouette again noted.  Atherosclerotic  calcification in the thoracic aorta. Prior median sternotomy. Left  anterior chest wall cardiac device with lead tips in the right atrium  and right ventricle.      Impression    IMPRESSION: A moderate-sized region of patchy opacities in the mid and  inferior right lung is nonspecific, but could be infectious or  inflammatory in etiology. A follow-up chest radiograph would be useful  in one month for reevaluation.    RAYSHAWN CASTANEDA MD

## 2018-09-25 NOTE — IP AVS SNAPSHOT
"          [place] [address] SURGICAL SPECIALITIES: [phone]                                              INTERAGENCY TRANSFER FORM - LAB / IMAGING / EKG / EMG RESULTS   2018                    Hospital Admission Date: 2018  JESSIE EASTMAN   : 1929  Sex: Female        Attending Provider: Kevin Sinclair MD     Allergies:  Codeine Sulfate    Infection:  None   Service:  INTERMEDIATE    Ht:  1.626 m (5' 4\")   Wt:  62.8 kg (138 lb 7.2 oz)   Admission Wt:  62.6 kg (138 lb)    BMI:  23.76 kg/m 2   BSA:  1.68 m 2            Patient PCP Information     Provider PCP Type    Raj García MD General         Lab Results - 3 Days      INR [[id number]] (Abnormal)  Resulted: 18, Result status: Final result    Ordering provider: Art Hoover MD  18 0000 Resulting lab: [facility]    Specimen Information    Type Source Collected On   Blood  18 0806          Components       Value Reference Range Flag Lab   INR 2.52 0.86 - 1.14 H FrStHsLb            Urea nitrogen [[id number]] (Abnormal)  Resulted: 18, Result status: Final result    Ordering provider: Art Hoover MD  18 0000 Resulting lab: [facility]    Specimen Information    Type Source Collected On   Blood  18 0806          Components       Value Reference Range Flag Lab   Urea Nitrogen 54 7 - 30 mg/dL H FrStHsLb            Creatinine [[id number]] (Abnormal)  Resulted: 18, Result status: Final result    Ordering provider: Art Hoover MD  18 0000 Resulting lab: [facility]    Specimen Information    Type Source Collected On   Blood  18 0806          Components       Value Reference Range Flag Lab   Creatinine 1.38 0.52 - 1.04 mg/dL H FrStHsLb   GFR Estimate 36 >60 mL/min/1.7m2 L FrStHsLb   Comment:  Non  GFR Calc   GFR Estimate If Black 44 >60 mL/min/1.7m2 L FrStHsLb   Comment:   " GFR Calc            Blood culture [[id number]]  Resulted: 09/28/18 0617, Result status: Preliminary result    Ordering provider: Kevin Sinclair MD  09/25/18 0716 Resulting lab: [facility]    Specimen Information    Type Source Collected On   Blood  09/25/18 1347   Comment:  Right Arm          Components       Value Reference Range Flag Lab   Specimen Description Blood Right Arm      Special Requests Aerobic and anaerobic bottles received   FrStHsLb   Culture Micro No growth after 3 days   75            Blood culture [[id number]]  Resulted: 09/28/18 0617, Result status: Preliminary result    Ordering provider: Kevin Sinclair MD  09/25/18 0716 Resulting lab: [facility]    Specimen Information    Type Source Collected On   Blood  09/25/18 1340   Comment:  Left Arm          Components       Value Reference Range Flag Lab   Specimen Description Blood Left Arm      Special Requests Aerobic and anaerobic bottles received   FrStHsLb   Culture Micro No growth after 3 days   75            INR [[id number]] (Abnormal)  Resulted: 09/27/18 0615, Result status: Final result    Ordering provider: Art Hoover MD  09/27/18 0000 Resulting lab: [facility]    Specimen Information    Type Source Collected On   Blood  09/27/18 0547          Components       Value Reference Range Flag Lab   INR 4.10 0.86 - 1.14 H FrStHsLb            Basic metabolic panel [[id number]] (Abnormal)  Resulted: 09/27/18 0609, Result status: Final result    Ordering provider: Art Hoover MD  09/27/18 0000 Resulting lab: [facility]    Specimen Information    Type Source Collected On   Blood  09/27/18 0547          Components       Value Reference Range Flag Lab   Sodium 142 133 - 144 mmol/L  FrStHsLb   Potassium 4.3 3.4 - 5.3 mmol/L  FrStHsLb   Chloride 103 94 - 109 mmol/L  FrStHsLb   Carbon Dioxide 33 20 - 32 mmol/L H FrStHsLb   Anion Gap 6 3 - 14  mmol/L  FrStHsLb   Glucose 168 70 - 99 mg/dL H FrStHsLb   Urea Nitrogen 52 7 - 30 mg/dL H FrStHsLb   Creatinine 1.42 0.52 - 1.04 mg/dL H FrStHsLb   GFR Estimate 35 >60 mL/min/1.7m2 L FrStHsLb   Comment:  Non  GFR Calc   GFR Estimate If Black 42 >60 mL/min/1.7m2 L FrStHsLb   Comment:  African American GFR Calc   Calcium 9.0 8.5 - 10.1 mg/dL  FrStHsLb            CBC with platelets [[id number]] (Abnormal)  Resulted: 09/27/18 0553, Result status: Final result    Ordering provider: Art Hoover MD  09/27/18 0000 Resulting lab: [facility]    Specimen Information    Type Source Collected On   Blood  09/27/18 0547          Components       Value Reference Range Flag Lab   WBC 9.4 4.0 - 11.0 10e9/L  FrStHsLb   RBC Count 3.18 3.8 - 5.2 10e12/L L FrStHsLb   Hemoglobin 9.9 11.7 - 15.7 g/dL L FrStHsLb   Hematocrit 30.9 35.0 - 47.0 % L FrStHsLb   MCV 97 78 - 100 fl  FrStHsLb   MCH 31.1 26.5 - 33.0 pg  FrStHsLb   MCHC 32.0 31.5 - 36.5 g/dL  FrStHsLb   RDW 16.2 10.0 - 15.0 % H FrStHsLb   Platelet Count 118 150 - 450 10e9/L L FrStHsLb            Basic metabolic panel [[id number]] (Abnormal)  Resulted: 09/26/18 0642, Result status: Final result    Ordering provider: Kevin Sinclair MD  09/26/18 0000 Resulting lab: [facility]    Specimen Information    Type Source Collected On   Blood  09/26/18 0550          Components       Value Reference Range Flag Lab   Sodium 146 133 - 144 mmol/L H FrStHsLb   Potassium 3.6 3.4 - 5.3 mmol/L  FrStHsLb   Chloride 105 94 - 109 mmol/L  FrStHsLb   Carbon Dioxide 35 20 - 32 mmol/L H FrStHsLb   Anion Gap 6 3 - 14 mmol/L  FrStHsLb   Glucose 119 70 - 99 mg/dL H FrStHsLb   Urea Nitrogen 46 7 - 30 mg/dL H FrStHsLb   Creatinine 1.61 0.52 - 1.04 mg/dL H FrStHsLb   GFR Estimate 30 >60 mL/min/1.7m2 L FrStHsLb   Comment:  Non  GFR Calc   GFR Estimate If Black 36 >60 mL/min/1.7m2 L FrStHsLb   Comment:  African American GFR Calc   Calcium  8.8 8.5 - 10.1 mg/dL  FrStHsLb            INR [[id number]] (Abnormal)  Resulted: 09/26/18 0635, Result status: Final result    Ordering provider: Art Hoover MD  09/26/18 0000 Resulting lab: [facility]    Specimen Information    Type Source Collected On   Blood  09/26/18 0550          Components       Value Reference Range Flag Lab   INR 4.59 0.86 - 1.14 H FrStHsLb            CBC with platelets [[id number]] (Abnormal)  Resulted: 09/26/18 0619, Result status: Final result    Ordering provider: Kevin Sinclair MD  09/26/18 0000 Resulting lab: [facility]    Specimen Information    Type Source Collected On   Blood  09/26/18 0550          Components       Value Reference Range Flag Lab   WBC 10.4 4.0 - 11.0 10e9/L  FrStHsLb   RBC Count 3.16 3.8 - 5.2 10e12/L L FrStHsLb   Hemoglobin 9.8 11.7 - 15.7 g/dL L FrStHsLb   Hematocrit 30.7 35.0 - 47.0 % L FrStHsLb   MCV 97 78 - 100 fl  FrStHsLb   MCH 31.0 26.5 - 33.0 pg  FrStHsLb   MCHC 31.9 31.5 - 36.5 g/dL  FrStHsLb   RDW 15.7 10.0 - 15.0 % H FrStHsLb   Platelet Count 116 150 - 450 10e9/L L FrStHsLb            Glucose by meter [[id number]] (Abnormal)  Resulted: 09/26/18 0242, Result status: Final result    Ordering provider: Kevin Sinclair MD  09/26/18 0227 Resulting lab: POINT OF CARE TEST, GLUCOSE    Specimen Information    Type Source Collected On     09/26/18 0227          Components       Value Reference Range Flag Lab   Glucose 153 70 - 99 mg/dL H 170            Procalcitonin [[id number]]  Resulted: 09/25/18 1451, Result status: Final result    Ordering provider: Kevin Sinclair MD  09/25/18 0716 Resulting lab: [facility]    Specimen Information    Type Source Collected On   Blood  09/25/18 1340          Components       Value Reference Range Flag Lab   Procalcitonin 5.61 ng/ml  FrStHsLb   Comment:         2.00-9.99 ng/ml  High risk for progression to severe sepsis.  Recommendation: Strongly recommend  initiating or continuing antibiotics.   Evaluate culture results and clinical features to target antibacterial   therapy. Obtain blood cultures and other relevant cultures if not done. Repeat   PCT in 2 days to guide antibiotic de-escalation. Consider de-escalating   antibiotics when PCT concentration is <80% of peak or abs PCT <0.5.              Blood gas arterial and oxyhgb [[id number]] (Abnormal)  Resulted: 09/25/18 0810, Result status: Final result    Ordering provider: Kevin Sinclair MD  09/25/18 0716 Resulting lab: [facility]    Specimen Information    Type Source Collected On   Blood  09/25/18 0737          Components       Value Reference Range Flag Lab   pH Arterial 7.43 7.35 - 7.45 pH  FrStHsLb   pCO2 Arterial 52 35 - 45 mm Hg H FrStHsLb   pO2 Arterial 88 80 - 105 mm Hg  FrStHsLb   Bicarbonate Arterial 34 21 - 28 mmol/L H FrStHsLb   FIO2 60%   FrStHsLb   Oxyhemoglobin Arterial 96 92 - 100 %  FrStHsLb   Base Excess Art 8.4 mmol/L  FrStHsLb   Comment:  Abnormal Result, Ref range: -9.0 to 1.8            INR [[id number]] (Abnormal)  Resulted: 09/25/18 0548, Result status: Final result    Ordering provider: Libby Webb MD  09/25/18 7637 Resulting lab: [facility]    Specimen Information    Type Source Collected On   Blood  09/25/18 0451          Components       Value Reference Range Flag Lab   INR 3.26 0.86 - 1.14 H FrStHsLb            Troponin I (now) [[id number]]  Resulted: 09/25/18 0526, Result status: Final result    Ordering provider: Libby Webb MD  09/25/18 3162 Resulting lab: [facility]    Specimen Information    Type Source Collected On   Blood  09/25/18 0451          Components       Value Reference Range Flag Lab   Troponin I ES <0.015 0.000 - 0.045 ug/L  FrStHsLb   Comment:         The 99th percentile for upper reference range is 0.045 ug/L.  Troponin values   in the range of 0.045 - 0.120 ug/L may be associated with risks  of adverse   clinical events.              Nt probnp inpatient (BNP) [[id number]] (Abnormal)  Resulted: 09/25/18 0526, Result status: Final result    Ordering provider: Libby Webb MD  09/25/18 0447 Resulting lab: [facility]    Specimen Information    Type Source Collected On   Blood  09/25/18 0451          Components       Value Reference Range Flag Lab   N-Terminal Pro BNP Inpatient 2169 0 - 1800 pg/mL H FrStHsLb   Comment:            Reference range shown and results flagged as abnormal are suggested inpatient   cut points for confirming diagnosis if CHF in an acute setting. Establishing a   baseline value for each individual patient is useful for follow-up. An   inpatient or emergency department NT-proPBNP <300 pg/mL effectively rules out   acute CHF, with 99% negative predictive value.  The outpatient non-acute reference range for ruling out CHF is:   0-125 pg/mL (age 18 to less than 75)   0-450 pg/mL (age 75 yrs and older)              Basic metabolic panel [[id number]] (Abnormal)  Resulted: 09/25/18 0521, Result status: Final result    Ordering provider: Libby Webb MD  09/25/18 0447 Resulting lab: [facility]    Specimen Information    Type Source Collected On   Blood  09/25/18 0451          Components       Value Reference Range Flag Lab   Sodium 144 133 - 144 mmol/L  FrStHsLb   Potassium 4.0 3.4 - 5.3 mmol/L  FrStHsLb   Chloride 103 94 - 109 mmol/L  FrStHsLb   Carbon Dioxide 37 20 - 32 mmol/L H FrStHsLb   Anion Gap 4 3 - 14 mmol/L  FrStHsLb   Glucose 178 70 - 99 mg/dL H FrStHsLb   Urea Nitrogen 30 7 - 30 mg/dL  FrStHsLb   Creatinine 1.38 0.52 - 1.04 mg/dL H FrStHsLb   GFR Estimate 36 >60 mL/min/1.7m2 L FrStHsLb   Comment:  Non  GFR Calc   GFR Estimate If Black 44 >60 mL/min/1.7m2 L FrStHsLb   Comment:  African American GFR Calc   Calcium 9.1 8.5 - 10.1 mg/dL  FrStHsLb            CBC with platelets differential [[id number]] (Abnormal)   Resulted: 09/25/18 0502, Result status: Final result    Ordering provider: Libby Webb MD  09/25/18 6299 Resulting lab: [facility]    Specimen Information    Type Source Collected On   Blood  09/25/18 0451          Components       Value Reference Range Flag Lab   WBC 8.7 4.0 - 11.0 10e9/L  FrStHsLb   RBC Count 3.78 3.8 - 5.2 10e12/L L FrStHsLb   Hemoglobin 11.7 11.7 - 15.7 g/dL  FrStHsLb   Hematocrit 37.9 35.0 - 47.0 %  FrStHsLb    78 - 100 fl  FrStHsLb   MCH 31.0 26.5 - 33.0 pg  FrStHsLb   MCHC 30.9 31.5 - 36.5 g/dL L FrStHsLb   RDW 15.6 10.0 - 15.0 % H FrStHsLb   Platelet Count 155 150 - 450 10e9/L  FrStHsLb   Diff Method Automated Method   FrStHsLb   % Neutrophils 83.0 %  FrStHsLb   % Lymphocytes 13.4 %  FrStHsLb   % Monocytes 2.6 %  FrStHsLb   % Eosinophils 0.7 %  FrStHsLb   % Basophils 0.2 %  FrStHsLb   % Immature Granulocytes 0.1 %  FrStHsLb   Nucleated RBCs 0 0 /100  FrStHsLb   Absolute Neutrophil 7.2 1.6 - 8.3 10e9/L  FrStHsLb   Absolute Lymphocytes 1.2 0.8 - 5.3 10e9/L  FrStHsLb   Absolute Monocytes 0.2 0.0 - 1.3 10e9/L  FrStHsLb   Absolute Eosinophils 0.1 0.0 - 0.7 10e9/L  FrStHsLb   Absolute Basophils 0.0 0.0 - 0.2 10e9/L  FrStHsLb   Abs Immature Granulocytes 0.0 0 - 0.4 10e9/L  FrStHsLb   Absolute Nucleated RBC 0.0   FrStHsLb            Testing Performed By     Lab - Abbreviation Name Director Address Valid Date Range    14 - FrStHsLb [facility] Unknown [address] [place] [address] 05/08/15 1057 - Present    75 - Unknown [facility] Unknown [address] [address] 01/15/15 1019 - Present    170 - Unknown POINT OF CARE TEST, GLUCOSE Unknown Unknown 10/31/11 1114 - Present            Unresulted Labs (24h ago through future)    Start       Ordered    09/26/18 0600  INR  (warfarin (COUMADIN) Pharmacy Consult-INITIAL ORDER)  DAILY,   Routine      09/25/18 1313    Unscheduled  Potassium  (Potassium Replacement  "- \"Standard\" - For K levels less than 3.4 mmol/L - UU,UR,UA,RH,SH,PH,WY )  CONDITIONAL (SPECIFY),   Routine     Comments:  Obtain Potassium Level for these conditions:  *IF no potassium result within 24 hours before initiation of order set, draw potassium level with next lab collect.    *2 HOURS AFTER last IV potassium replacement dose and 4 hours after an oral replacement dose.  *Next morning after potassium dose.     Repeat Potassium Replacement if necessary.    09/25/18 0716    Unscheduled  Blood gas blood with oxy  CONDITIONAL X 3,   Routine     Comments:  Release order if patient in respiratory distress and Notify Provider.    09/25/18 0716    Unscheduled  Glucose  CONDITIONAL X 3,   Routine     Comments:  Release order if patient experiencing hyperglycemia or hypoglycemia symptoms and Notify Provider.    09/25/18 0716    Unscheduled  Hemoglobin  CONDITIONAL X 3,   Routine     Comments:  Release order if patient experiencing active bleeding and/or hypotension and Notify Provider.    09/25/18 0716    Unscheduled  Blood gas arterial and oxyhgb  CONDITIONAL (SPECIFY),   Routine     Comments:  STAT PRN at RT/RN discretion.    09/25/18 0716    Unscheduled  Blood gas venous and oxyhgb  CONDITIONAL (SPECIFY),   Routine     Comments:  STAT PRN as needed at  RT/RN discretion.  PICC line Draw preferred    09/25/18 0716         Imaging Results - 3 Days      XR Chest Port 1 View [[id number]]  Resulted: 09/25/18 0514, Result status: Final result    Ordering provider: Libby Webb MD  09/25/18 0447 Resulted by: Skyler Campbell MD    Performed: 09/25/18 0448 - 09/25/18 0454 Resulting lab: RADIOLOGY RESULTS    Narrative:       CHEST SINGLE VIEW PORTABLE  9/25/2018 4:54 AM     HISTORY: Shortness of breath.    COMPARISON: 11/15/2017.    FINDINGS: A moderate-sized region of patchy opacities in the mid and  inferior right lung. A band-like opacity in the mid right lung is  again noted and likely represents " scarring. The left lung is  relatively clear. Moderate to marked enlargement of the  cardiopericardial silhouette again noted. Atherosclerotic  calcification in the thoracic aorta. Prior median sternotomy. Left  anterior chest wall cardiac device with lead tips in the right atrium  and right ventricle.      Impression:       IMPRESSION: A moderate-sized region of patchy opacities in the mid and  inferior right lung is nonspecific, but could be infectious or  inflammatory in etiology. A follow-up chest radiograph would be useful  in one month for reevaluation.    RAYSHAWN CASTANEDA MD      Testing Performed By     Lab - Abbreviation Name Director Address Valid Date Range    104 - Rad Rslts RADIOLOGY RESULTS Unknown Unknown 02/16/05 1553 - Present            Encounter-Level Documents:     There are no encounter-level documents.      Order-Level Documents:     There are no order-level documents.

## 2018-09-25 NOTE — PHARMACY-ANTICOAGULATION SERVICE
Clinical Pharmacy - Warfarin Dosing Consult     Pharmacy has been consulted to manage this patient s warfarin therapy.  Indication: Atrial Fibrillation  Therapy Goal: INR 2-3  Warfarin Prior to Admission: Yes  Warfarin PTA Regimen: 4 mg daily.    INR   Date Value Ref Range Status   09/25/2018 3.26 (H) 0.86 - 1.14 Final   11/20/2017 1.93 (H) 0.86 - 1.14 Final       Recommend warfarin 4 mg today.  Pharmacy will monitor Neda Dennis daily and order warfarin doses to achieve specified goal.      Please contact pharmacy as soon as possible if the warfarin needs to be held for a procedure or if the warfarin goals change.      Cinthia Vale, SemajD, BCPS

## 2018-09-25 NOTE — IP AVS SNAPSHOT
` [place] [address] [facility]: [phone]            Medication Administration Report for Neda Dennis as of 09/28/18 1129   Legend:    Given Hold Not Given Due Canceled Entry Other Actions    Time Time (Time) Time  Time-Action       Inactive    Active    Linked        Medications 09/22/18 09/23/18 09/24/18 09/25/18 09/26/18 09/27/18 09/28/18    acetaminophen (TYLENOL) tablet 650 mg  Dose: 650 mg  Freq: EVERY 4 HOURS PRN Route: PO  PRN Reason: mild pain  Start: 09/25/18 0716   Admin Instructions: Alternate ibuprofen (if ordered) with acetaminophen.  Maximum acetaminophen dose from all sources = 75 mg/kg/day not to exceed 4 grams/day.    Admin. Amount: 2 tablet (2 × 325 mg tablet)  Dispense Loc:  ADS 33B               albuterol neb solution 2.5 mg  Dose: 3 mL  Freq: EVERY 2 HOURS PRN Route: NEBULIZATION  PRN Reason: shortness of breath / dyspnea  Start: 09/25/18 0716   Admin. Amount: 2.5 mg = 3 mL Conc: 2.5 mg/3 mL  Dispense Loc:  ADS 33B  Volume: 3 mL               bisacodyl (DULCOLAX) Suppository 10 mg  Dose: 10 mg  Freq: DAILY PRN Route: RE  PRN Reason: constipation  Start: 09/25/18 0716   Admin Instructions: Hold for loose stools.  This is the third step of a three step constipation treatment protocol.    Admin. Amount: 1 suppository (1 × 10 mg suppository)  Dispense Loc:  ADS 33B               fluticasone-vilanterol (BREO ELLIPTA) 100-25 MCG/INH inhaler 1 puff  Dose: 1 puff  Freq: DAILY Route: IN  Start: 09/25/18 1315   Admin Instructions: *Do not use more frequently than once daily.*  Rinse mouth after use.    Admin. Amount: 1 puff  Last Admin: 09/28/18 0911  Dispense Loc:  Main Pharmacy        1518 (1 puff)-Given        0908 (1 puff)-Given        0813 (1 puff)-Given        0911 (1 puff)-Given           HOLD: All Oral Medications  Freq: HOLD Route: XX  Start: 09/25/18 0716   Admin Instructions: I (provider) have reviewed/adjusted the medications and it is okay to hold  all oral medication doses while on BIPAP/AVAPS/AVAPS AE until patient is able to be off BIPAP/AVAPS/AVAPS AE for 2 hours with patient off BIPAP/AVAPS/AVAPS AE  for at least 30 minutes before and 30 minutes after taking the medication.  No lozenges or gum should be given while patient on BIPAP/AVAPS/AVAPS AE .    Order specific questions:  Medication(s) to hold: All oral medications  Parameter for hold (doses,days,conditions) : Hold while NPO     Dispense Loc:  Main Pharmacy               hypromellose-dextran (ARTIFICAL TEARS) 0.1-0.3 % ophthalmic solution 1 drop  Dose: 1 drop  Freq: EVERY 1 HOUR PRN Route: Both Eyes  PRN Reason: dry eyes  Start: 18 0716   Admin. Amount: 1 drop  Dispense Loc:  Main Pharmacy  Volume: 15 mL               ipratropium - albuterol 0.5 mg/2.5 mg/3 mL (DUONEB) neb solution 3 mL  Dose: 3 mL  Freq: EVERY 4 HOURS Route: NEBULIZATION  Start: 18 0730   Admin. Amount: 3 mL  Last Admin: 18 110  Dispense Loc:  ADS 33B  Volume: 3 mL        0903 (3 mL)-Given       1049 (3 mL)-Given       1610 (3 mL)-Given       1950 (3 mL)-Given        0026 (3 mL)-Given       (0330)-Not Given       0735 (3 mL)-Given       1116 (3 mL)-Given       1551 (3 mL)-Given       2025 (3 mL)-Given       2355 (3 mL)-Given        (0320)-Not Given       0711 (3 mL)-Given       1130 (3 mL)-Given       1454 (3 mL)-Given       2007 (3 mL)-Given       (2329)-Not Given        (0240)-Not Given       0746 (3 mL)-Given       1101 (3 mL)-Given       [ ] 1530       [ ] 1930       [ ] 2330           levofloxacin (LEVAQUIN) tablet 500 mg  Dose: 500 mg  Freq: DAILY Route: PO  Indications of Use: COMMUNITY ACQUIRED PNEUMONIA  Start: 18 0900   Admin. Amount: 1 tablet (1 × 500 mg tablet)  Last Admin: 18 09  Dispense Loc:  ADS 33B           0911 (500 mg)-Given             Start: 18 1300   End: 18 0114   Admin Instructions: Horacio Rucker : qi override    Administrations Remainin                   0114-Med Discontinued       LORazepam (ATIVAN) injection 0.5 mg  Dose: 0.5 mg  Freq: ONCE Route: IV  Start: 18 0830   Admin Instructions: For IV PUSH: Dilute with equal volume of NS. For ordered IV doses 0.1-4 mg give IV Push. Administer each 2mg over 1-5 minutes.    Admin. Amount: 0.5 mg = 0.25 mL Conc: 2 mg/mL  Dispense Loc:  ADS 33B  Administrations Remainin  Volume: 1 mL        (2316)-Not Given              melatonin tablet 1 mg  Dose: 1 mg  Freq: AT BEDTIME PRN Route: PO  PRN Reason: sleep  Start: 18   Admin Instructions: Do not give unless at least 6 hours of uninterrupted sleep is expected.    Admin. Amount: 1 tablet (1 × 1 mg tablet)  Dispense Loc:  ADS 33B               metoprolol (LOPRESSOR) injection 2.5 mg  Dose: 2.5 mg  Freq: EVERY 4 HOURS PRN Route: IV  PRN Reason: other  PRN Comment: HR > 120, hold for SBP < 90  Start: 18   Admin Instructions: Give IV Push undiluted for IV doses 0.1-15 mg. For new therapy administer each 5mg administered over 1-2 minutes.  When replacing chronic therapy, administer dose over 5-10 minutes.    Admin. Amount: 2.5 mg = 2.5 mL Conc: 1 mg/mL  Dispense Loc:  ADS 33B  Volume: 2.5 mL               metoprolol tartrate (LOPRESSOR) tablet 50 mg  Dose: 50 mg  Freq: DAILY Route: PO  Start: 18 1400   Admin Instructions: Hold for SBP less than 100 or HR < 60    Admin. Amount: 1 tablet (1 × 50 mg tablet)  Last Admin: 18 0911  Dispense Loc:  ADS 33B         1624 (50 mg)-Given        0809 (50 mg)-Given        0911 (50 mg)-Given           morphine (PF) injection 1 mg  Dose: 1 mg  Freq: EVERY 2 HOURS PRN Route: IV  PRN Reason: other  PRN Comment: pain control or improvement in physical function. Hold dose for analgesic side effects.  Start: 18   Admin Instructions: Notify provider to assess for uncontrolled pain or analgesic side effects. Hold while on PCA or with regular IV opioid dosing  For ordered IV doses 0.1-15  mg give IV Push undiluted over 4-5 minutes.    Admin. Amount: 1 mg  Dispense Loc:  ADS 33B               naloxone (NARCAN) injection 0.1-0.4 mg  Dose: 0.1-0.4 mg  Freq: EVERY 2 MIN PRN Route: IV  PRN Reason: opioid reversal  Start: 09/25/18 0716   Admin Instructions: For respiratory rate LESS than or EQUAL to 8.  Partial reversal dose:  0.1 mg titrated q 2 minutes for Analgesia Side Effects Monitoring Sedation Level of 3 (frequently drowsy, arousable, drifts to sleep during conversation).Full reversal dose:  0.4 mg bolus for Analgesia Side Effects Monitoring Sedation Level of 4 (somnolent, minimal or no response to stimulation).  For ordered IV doses 0.1-2mg give IVP. Give each 0.4mg over 15 seconds in emergency situations. For non-emergent situations further dilute in 9mL of NS to facilitate titration of response.    Admin. Amount: 0.1-0.4 mg = 0.25-1 mL Conc: 0.4 mg/mL  Dispense Loc:  ADS 33B  Volume: 1 mL               nitroGLYcerin (NITROSTAT) sublingual tablet 0.4 mg  Dose: 0.4 mg  Freq: EVERY 5 MIN PRN Route: SL  PRN Reason: chest pain  Start: 09/25/18 0716   Admin Instructions: Maximum 3 doses in 15 minutes.  Notify provider if no relief after 3 doses.    Do NOT give nitroglycerin SL IF the patient has taken avanafil (STENDRA), sildenafil (VIAGRA) or (REVATIO) within the last 8 hours, vardenafil (LEVITRA) or (STAXYN) within the last 18 hours, tadalafil (CIALIS) or (ADCIRCA) within the last 36 hours. Inform provider if patient has taken one of these medications.  If patient is still having acute angina requiring treatment, an alternative treatment option may be used such as: IV beta-blocker [2.5 mg - 5 mg metoprolol (LOPRESSOR)] if ordered by a provider.    Admin. Amount: 1 tablet (1 × 0.4 mg tablet)  Dispense Loc:  ADS 33B               No lozenges or gum should be given while patient on BIPAP/AVAPS/AVAPS AE  Freq: CONTINUOUS PRN Route: XX  Start: 09/25/18 0716   Dispense Loc: [facility] Floor Stock                ondansetron (ZOFRAN-ODT) ODT tab 4 mg  Dose: 4 mg  Freq: EVERY 6 HOURS PRN Route: PO  PRN Reasons: nausea,vomiting  Start: 09/25/18 0716   Admin Instructions: This is Step 1 of nausea and vomiting management.  If nausea not resolved in 15 minutes, go to Step 2 prochlorperazine (COMPAZINE). Do not push through foil backing. Peel back foil and gently remove. Place on tongue immediately. Administration with liquid unnecessary  With dry hands, peel back foil backing and gently remove tablet; do not push oral disintegrating tablet through foil backing; administer immediately on tongue and oral disintegrating tablet dissolves in seconds; then swallow with saliva; liquid not required.    Admin. Amount: 1 tablet (1 × 4 mg tablet)  Dispense Loc: [facility]              Or  ondansetron (ZOFRAN) injection 4 mg  Dose: 4 mg  Freq: EVERY 6 HOURS PRN Route: IV  PRN Reasons: nausea,vomiting  Start: 09/25/18 0716   Admin Instructions: This is Step 1 of nausea and vomiting management.  If nausea not resolved in 15 minutes, go to Step 2 prochlorperazine (COMPAZINE).  Irritant. For ordered IV doses 0.1-4 mg, give IV Push undiluted over 2-5 minutes.    Admin. Amount: 4 mg = 2 mL Conc: 4 mg/2 mL  Dispense Loc: [facility]  Infused Over: 2-5 Minutes  Volume: 2 mL               pantoprazole (PROTONIX) EC tablet 40 mg  Dose: 40 mg  Freq: EVERY MORNING BEFORE BREAKFAST Route: PO  Start: 09/26/18 0730   Admin Instructions: DO NOT CRUSH.    Admin. Amount: 1 tablet (1 × 40 mg tablet)  Last Admin: 09/28/18 0632  Dispense Loc:  ADS 33B         0905 (40 mg)-Given        0703 (40 mg)-Given        0632 (40 mg)-Given           polyethylene glycol (MIRALAX/GLYCOLAX) Packet 17 g  Dose: 17 g  Freq: DAILY PRN Route: PO  PRN Reason: constipation  Start: 09/25/18 0716   Admin Instructions: Give in 8oz of  water, juice, or soda. Hold for loose stools.  This is the second step of a three step constipation treatment protocol.  1 Packet = 17 grams. Mixed  prescribed dose in 8 ounces of water. Follow with 8 oz. of water.    Admin. Amount: 17 g  Dispense Loc:  ADS 33B               potassium chloride (KLOR-CON) Packet 20-40 mEq  Dose: 20-40 mEq  Freq: EVERY 2 HOURS PRN Route: ORAL OR FEED  PRN Reason: potassium supplementation  Start: 09/25/18 0716   Admin Instructions: Use if unable to tolerate tablets.  If Serum K+ 3.0-3.3, dose = 60 mEq po total dose (40 mEq x1 followed in 2 hours by 20 mEq x1). Recheck K+ level 4 hours after dose and the next AM.  If Serum K+ 2.5-2.9, dose = 80 mEq po total dose (40 mEq Q2H x2). Recheck K+ level 4 hours after dose and the next AM.  If Serum K+ less than 2.5, See IV order.  Dissolve packet contents in 4-8 ounces of cold water or juice.    Admin. Amount: 20-40 mEq  Dispense Loc:  ADS 33B               potassium chloride 10 mEq in 100 mL intermittent infusion with 10 mg lidocaine  Dose: 10 mEq  Freq: EVERY 1 HOUR PRN Route: IV  PRN Reason: potassium supplementation  Start: 09/25/18 0716   Admin Instructions: Infuse via PERIPHERAL LINE. Use potassium with lidocaine for pain with peripheral administration.  If Serum K+ 3.0-3.3, dose = 10 mEq/hr x4 doses (40 mEq IV total dose). Recheck K+ level 2 hours after dose and the next AM.  If Serum K+ less than 3.0, dose = 10 mEq/hr x6 doses (60 mEq IV total dose). Recheck K+ level 2 hours after dose and the next AM.    Admin. Amount: 10 mEq = 100 mL Conc: 10 mEq/100 mL  Dispense Loc: Contact Rx for dose  Infused Over: 1 Hours  Volume: 100 mL               potassium chloride 10 mEq in 100 mL sterile water intermittent infusion (premix)  Dose: 10 mEq  Freq: EVERY 1 HOUR PRN Route: IV  PRN Reason: potassium supplementation  Start: 09/25/18 0716   Admin Instructions: Infuse via PERIPHERAL LINE or CENTRAL LINE. Use for central line replacement if patient weight less than 65 kg, if patient is on TPN with high potassium content or if unit does not stock 20 mEq bags.   If Serum K+ 3.0-3.3, dose = 10  mEq/hr x4 doses (40 mEq IV total dose). Recheck K+ level 2 hours after dose and the next AM.   If Serum K+ less than 3.0, dose = 10 mEq/hr x6 doses (60 mEq IV total dose). Recheck K+ level 2 hours after dose and the next AM.    Admin. Amount: 10 mEq = 100 mL Conc: 10 mEq/100 mL  Dispense Loc: Contact Rx for dose  Infused Over: 60 Minutes  Volume: 100 mL               potassium chloride SA (K-DUR/KLOR-CON M) CR tablet 20-40 mEq  Dose: 20-40 mEq  Freq: EVERY 2 HOURS PRN Route: PO  PRN Reason: potassium supplementation  Start: 09/25/18 0716   Admin Instructions: Use if able to take PO.   If Serum K+ 3.0-3.3, dose = 60 mEq po total dose (40 mEq x1 followed in 2 hours by 20 mEq x1). Recheck K+ level 4 hours after dose and the next AM.  If Serum K+ 2.5-2.9, dose = 80 mEq po total dose (40 mEq Q2H x2). Recheck K+ level 4 hours after dose and the next AM.  If Serum K+ less than 2.5, See IV order.  DO NOT CRUSH    Admin. Amount: 1-2 tablet (1-2 × 20 mEq tablet)  Dispense Loc:  ADS 33B               predniSONE (DELTASONE) tablet 40 mg  Dose: 40 mg  Freq: DAILY Route: PO  Start: 09/27/18 0900   Admin. Amount: 2 tablet (2 × 20 mg tablet)  Last Admin: 09/28/18 0911  Dispense Loc:  ADS 33B          0809 (40 mg)-Given        0911 (40 mg)-Given           sodium chloride (PF) 0.9% PF flush 3 mL  Dose: 3 mL  Freq: EVERY 8 HOURS Route: IK  Start: 09/25/18 0730   Admin Instructions: And Q1H PRN, to lock peripheral IV dormant line.    Admin. Amount: 3 mL  Last Admin: 09/28/18 0912  Dispense Loc: [facility] Stock  Volume: 3 mL   Current Line: Peripheral IV 09/25/18 Right Upper forearm       0700 (3 mL)-Given              2240 (3 mL)-Given        0911 (3 mL)-Given       1625 (3 mL)-Given       2242 (3 mL)-Given        0708 (3 mL)-Given       1527 (3 mL)-Given        (0125)-Not Given       0912 (3 mL)-Given       [ ] 1600           sodium chloride (PF) 0.9% PF flush 3 mL  Dose: 3 mL  Freq: EVERY 1 HOUR PRN Route: IK  PRN Reason: line  flush  PRN Comment: for peripheral IV flush post IV meds  Start: 18 0716   Admin. Amount: 3 mL  Last Admin: 18 0434  Dispense Loc: [facility] Stock  Volume: 3 mL   Current Line: Peripheral IV 18 Right Upper forearm        1246 (3 mL)-Given        0434 (3 mL)-Given            umeclidinium (INCRUSE ELLIPTA) 62.5 MCG/INH inhaler 1 puff  Dose: 1 puff  Freq: DAILY Route: IN  Start: 18 1315   Admin. Amount: 1 puff  Last Admin: 18 0911  Dispense Loc: [facility]        1518 (1 puff)-Given        0908 (1 puff)-Given        0813 (1 puff)-Given        0911 (1 puff)-Given           Warfarin Therapy Reminder (Check START DATE - warfarin may be starting in the FUTURE)  Dose: 1 each  Freq: CONTINUOUS PRN Route: XX  Start: 18 1313   Admin Instructions: *Note to reorder warfarin daily*  Pharmacy Warfarin Dosing Service  Patient is on Warfarin Therapy - check for daily order    Admin. Amount: 1 each  Dispense Loc: [facility]                 Dose: 1 each  Freq: NO DOSE TODAY (WARFARIN) Route: XX  Start: 18 1110   End: 18 0109   Admin Instructions: No dose of Warfarin due today per order    Admin. Amount: 1 each  Dispense Loc: [facility]           0109-Med Discontinued      Future Medications  Medications 18       warfarin (COUMADIN) tablet 2 mg  Dose: 2 mg  Freq: ONCE AT 6PM Route: PO  Start: 18 1800   Admin. Amount: 1 tablet (1 × 2 mg tablet)  Dispense Loc:  ADS 33B  Administrations Remainin           [ ] 1800          Completed Medications  Medications 18         Dose: 62.5 mg  Freq: EVERY 24 HOURS Route: IV  Start: 18 1300   End: 18 1239   Admin Instructions: Give IV Doses 125mg and less IV Push over 2-3 minutes - reconstitute with 2 mL of bacteriostatic water if no diluent is provided. Doses greater than 125 mg need to be in  at least 50 mL IVPB.    Admin. Amount: 62.5 mg = 1 mL Conc: 125 mg/2 mL  Last Admin: 18 1239  Dispense Loc:  ADS 33B  Administrations Remainin  Volume: 2 mL   Current Line: Peripheral IV 18 Right Upper forearm        1239 (62.5 mg)-Given            Discontinued Medications  Medications 18         Dose: 80 mg  Freq: DAILY Route: PO  Start: 18 0900   End: 18 1020   Admin. Amount: 2 tablet (2 × 40 mg tablet)  Dispense Loc:  ADS 33B         1020-Med Discontinued  ()-Not Given               Dose: 750 mg  Freq: EVERY 48 HOURS Route: IV  Indications of Use: COMMUNITY ACQUIRED PNEUMONIA  Last Dose: 750 mg (18 0434)  Start: 18 0500   End: 18 1449   Admin Instructions: Dose adjusted per renal dosing policy.  Estimated CrCl = 20-49 mL/min.   Irritant. Administer at a rate of no greater than 100 mL/hr    Admin. Amount: 750 mg = 150 mL Conc: 750 mg/150 mL  Last Admin: 18 043  Dispense Loc:  Main Pharmacy  Infused Over: 90 Minutes  Administrations Remainin          0434 (750 mg)-New Bag       1449-Med Discontinued          Dose: 62.5 mg  Freq: EVERY 8 HOURS Route: IV  Start: 18 1300   End: 18 1312   Admin Instructions: Give IV Doses 125mg and less IV Push over 2-3 minutes - reconstitute with 2 mL of bacteriostatic water if no diluent is provided. Doses greater than 125 mg need to be in at least 50 mL IVPB.    Admin. Amount: 62.5 mg = 1 mL Conc: 125 mg/2 mL  Dispense Loc:  ADS 33B  Volume: 2 mL   Current Line: Peripheral IV 11/15/17 Left       1312-Med Discontinued                  Dose: 100 mg  Freq: DAILY Route: PO  Start: 18 1315   End: 18 1314   Admin. Amount: 1 tablet (1 × 100 mg tablet)        1314-Med Discontinued            Dose: 40 mg  Freq: DAILY WITH BREAKFAST Route: IV  Start: 18 0900   End: 18 1312   Admin Instructions: Irritant. For ordered IV doses 1-40  mg, give IV Push over 2 minutes when reconstituted with 10mL NS.    Admin. Amount: 40 mg  Dispense Loc:  ADS 33B  Volume: 10 mL   Current Line: Peripheral IV 11/15/17 Left       (1039)-Not Given       1312-Med Discontinued            Dose: 20 mEq  Freq: DAILY Route: PO  Start: 09/25/18 1315   End: 09/26/18 1020   Admin Instructions: DO NOT CRUSH    Admin. Amount: 1 tablet (1 × 20 mEq tablet)  Dispense Loc:  ADS 33B                1020-Med Discontinued  (1028)-Not Given               Dose: 1 each  Freq: NO DOSE TODAY (WARFARIN) Route: XX  Start: 09/26/18 0748   End: 09/26/18 2347   Admin Instructions: No dose of Warfarin due today per order. INR = 4.59    Admin. Amount: 1 each  Dispense Loc:  Main Pharmacy         2347-Med Discontinued           Dose: 1 each  Freq: NO DOSE TODAY (WARFARIN) Route: XX  Start: 09/25/18 1335   End: 09/25/18 2334   Admin Instructions: No dose of Warfarin due today per order. INR = 3.26    Admin. Amount: 1 each  Dispense Loc:  Main Pharmacy        2334-Med Discontinued       Medications 09/22/18 09/23/18 09/24/18 09/25/18 09/26/18 09/27/18 09/28/18

## 2018-09-25 NOTE — ED PROVIDER NOTES
"  History     Chief Complaint:  Shortness of Breath    The history is provided by the patient and the EMS personnel.      Neda Dennis is an anticoagulated 88 year old female on warfarin, with a history of COPD, carcinoma of the lung, hypertension and atrial fibrillation who presents to the emergency department via EMS for evaluation of shortness of breath. Of note, the patient has known prior lung carcinoma and COPD and is typically on 4L of O2 at all times. Earlier this morning the patient called EMS as she had been unable to fall asleep on her recliner. On arrival EMS notes that the patient was notably short of breath and that her \"lungs sounded tight with expiratory wheezes and minor crackles with a productive cough.\" The patient had O2 sats of 76% on 4L of O2 and was given a neb en route where her O2 sats increased to 88%. En route, they note that the patient had a blood sugar of 156 and that she had otherwise stable vitals.     The patient arrived in the ED via EMS with a neb in place. She endorses increasing shortness of breath with productive cough that began this morning, but denies any chest pain or recent illness that may have provoked her known COPD. The patient reports that she is on blood thinners.     Allergies:  Codeine Sulfate  Hydromorphone     Medications:    Cholecalciferol  Epipen  Umeclidinium  Pantoprazole  Duoneb  Furosemide  Warfarin  Triamcinolone  Potassium Chloride  Metoprolol  Fluticasone-Salmeterol     Past Medical History:    Hypertension  Tobacco Dependence  Mitral Valve Regurgitation  Polycythemia  Hyperlipidemia  Lung Carcinoma  H Pylori  Atrial Fibrillation  Ulcers  Anemia  Peripheral Artery Disease  Aortic Stenosis  Osteopenia  Tricuspid Insufficiency  Arteriosclerotic Heart Disease  COPD    Past Surgical History:    Esophagoscopy, Gastroscopy, Duodenoscopy  Ovarian Cystectomy  Appendectomy  Mitral Valve Repair  Septal Closure  Lung Lobectomy    Family History:    No past " pertinent family history.    Social History:  Marital Status:   [5]  Tobacco Use: Former  Alcohol Use: No    Review of Systems   Constitutional: Negative for fever.   HENT: Negative for sore throat.    Respiratory: Positive for cough, chest tightness, shortness of breath and wheezing.    Cardiovascular: Negative for chest pain.   All other systems reviewed and are negative.      Physical Exam     Patient Vitals for the past 24 hrs:   BP Temp Temp src Pulse Heart Rate Resp SpO2   09/25/18 0600 109/44 - - - 66 27 97 %   09/25/18 0545 118/51 - - - - 13 98 %   09/25/18 0530 137/64 - - - 70 27 96 %   09/25/18 0515 (!) 138/92 - - - 81 21 96 %   09/25/18 0500 (!) 135/101 97.9  F (36.6  C) Temporal - 75 27 92 %   09/25/18 0448 118/89 - - 76 - 28 (!) 79 %       Physical Exam  General: Appears uncomfortable sitting up in bed  Eyes:  The pupils are equal and round    Conjunctivae and sclerae are normal  ENT:    Moist mucous membranes  Neck:  Normal range of motion  CV:  Regular rate and rhythm    Skin warm and well perfused   Resp:  Lungs - bilateral expiratory wheezing bilaterally    Tachypnea    Increase in work of breathing  GI:  Abdomen is soft, there is no rigidity    No distension    No rebound tenderness     No abdominal tenderness  MS:  Normal muscular tone. No leg swelling  Skin:  No rash or acute skin lesions noted  Neuro:   Awake, alert.      Speech is normal and fluent.    Face is symmetric.     Moves all extremities equally  Psych:  Normal affect.  Appropriate interactions.      Emergency Department Course   ECG:  Indication: Shortness of Breath, COPD  Time: 0455  Vent. Rate 93 bpm. OR interval *. QRS duration 94. QT/QTc 358/445. P-R-T axis * -2 100. Undetermined rhythm. Incomplete right bundle branch block. Septal infarct, age undetermined. Abnormal ECG. No significant change compared to EKG dated 11/15/17. Read time: 0519    Imaging:  XR Portable Chest 1 view:   A moderate-sized region of patchy opacities  in the mid and inferior right lung is nonspecific, but could be infectious or inflammatory in etiology. A follow-up chest radiograph would be useful in one month for reevaluation. As per radiology.     Laboratory:  CBC: WBC: 8.7, HGB: 11.7, PLT: 155  BMP: Glucose 178 (H), CO2: 37 (H), Creatinine: 1.38 (H), GFR: 36 (L), o/w WNL  BNP: 2169 (H)  INR: 3.26 (H)  Troponin I: <0.015    Interventions:  0450 Albuterol-Ipratropium 2.5mg-0.5mg/3ml, inhalation solution, Nebulizer  0455 Solumedrol 125 mg, IV  0502 Albuterol-Ipratropium 2.5mg-0.5mg/3ml, inhalation solution, Nebulizer  0526 Levaquin, 750 mg, IV    Emergency Department Course:    0442 Patient arrival in the ED via EMS  0444 I began my examination of the patient  0446 BiPAP administered by RT    IV inserted. Medicine administered as documented above. Blood drawn. This was sent to the lab for further testing, results above.    The patient was sent for a chest xray while in the emergency department, findings above.     EKG obtained in the ED, see results above.     0527 I rechecked the patient and discussed the results of her workup thus far. The patient reports that she is feeling improved and is breathing comfortably with BiPAP in place.     0542  I consulted with Dr. Sinclair of the hospitalist services. They are in agreement to accept the patient for admission.    Findings and plan explained to the patient and spouse who consent to admission. Discussed the patient with Dr. Sinclair, who will admit the patient to an [facility] bed for further monitoring, evaluation, and treatment.       Impression & Plan      Medical Decision Making:  Neda Dennis is a 88 year old female who presented to the ED with shortness of breath. Concern for COPD exacerbation based on hx and exam. Patient evaluated immediately upon arrival to ED. Given her increase in work of breathing, patient placed on bipap on arrival to ED. This improved her work of breathing and on reevaluations, breathing  much more comfortably. Given duonebs and solumedrol for presumed COPD exacerbation. Reports hx of CHF though do think that this is less likely. INR therapeutic, doubt PE. Doubt ACS with negative troponin, no chest pain. Xray chest shows opacity on right side. Given levaquin for presumed pneumonia. Will likely need CT chest in follow-up to rule out malignancy especially if area does not clear with antibiotics. Discussed with hospitalist for admission.    Critical Care time:  was 30 minutes for this patient excluding procedures.    Diagnosis:    ICD-10-CM    1. COPD exacerbation (H) J44.1 INR       Disposition:  Admitted to Dr. Sinclair to an [facility] bed    Scribe Disclosure:  I, Eduard Orozco, am serving as a scribe on 9/25/2018 at 4:42 AM to personally document services performed by Libby Webb MD based on my observations and the provider's statements to me.     Eduard Orozco  9/25/2018    EMERGENCY DEPARTMENT       Libby Webb MD  09/25/18 0913

## 2018-09-25 NOTE — IP AVS SNAPSHOT
MRN:[id number]                      After Visit Summary   9/25/2018    Neda Dennis    MRN: [id number]           Thank you!     Thank you for choosing [facility] for your care. Our goal is always to provide you with excellent care. Hearing back from our patients is one way we can continue to improve our services. Please take a few minutes to complete the written survey that you may receive in the mail after you visit with us. Thank you!        Patient Information     Date Of Birth          12/1/1929        Designated Caregiver       Most Recent Value    Caregiver    Will someone help with your care after discharge? no      About your hospital stay     You were admitted on:  September 25, 2018 You last received care in the:  [place] [address] [facility]    You were discharged on:  September 28, 2018       Who to Call     For medical emergencies, please call 911.  For non-urgent questions about your medical care, please call your primary care provider or clinic, [phone]          Attending Provider     Provider Specialty    Libby Webb MD Emergency Medicine    [place], Kevin Felton MD Internal Medicine       Primary Care Provider Office Phone # Fax #    Raj García [phone]      After Care Instructions     Activity - Up with nursing assistance           Advance Diet as Tolerated       Follow this diet upon discharge: Orders Placed This Encounter      Regular Diet Adult              Fall precautions           General info for SNF       Length of Stay Estimate: Short Term Care: Estimated # of Days <30  Condition at Discharge: Improving  Level of care:skilled   Rehabilitation Potential: Good  Admission H&P remains valid and up-to-date: Yes  Recent Chemotherapy: N/A  Use Nursing Home Standing Orders: N/A            Mantoux instructions       Give two-step Mantoux (PPD) Per Facility Policy Yes            Oxygen - Nasal cannula       4 Lpm by  nasal cannula to keep O2 sats 92% or greater.                  Follow-up Appointments     Follow Up and recommended labs and tests       Follow up with group home physician.  The following labs/tests are recommended: INR on 9/29. CBC and BMP in 1 week                  Additional Services     Occupational Therapy Adult Consult       Evaluate and treat as clinically indicated.    Reason:            Physical Therapy Adult Consult       Evaluate and treat as clinically indicated.    Reason:                  Further instructions from your care team       You are being discharged to a transitional care unit:  [facility]  Phone: [phone]        Warfarin Instruction     You have started taking a medicine called warfarin. This is a blood-thinning medicine (anticoagulant). It helps prevent and treat blood clots.      Before leaving the hospital, make sure you know how much to take and how long to take it.      You will need regular blood tests to make sure your blood is clotting safely. It is very important to see your doctor for regular blood tests.    Talk to your doctor before taking any new medicine (this includes over-the-counter drugs and herbal products). Many medicines can interact with warfarin. This may cause more bleeding or too much clotting.     Eating a lot of vitamin K--found in green, leafy vegetables--can change the way warfarin works in your body. Do NOT avoid these foods. Instead, try to eat the same amount each day.     Bleeding is the most common side-effect of warfarin. You may notice bleeding gums, a bloody nose, bruises and bleeding longer when you cut yourself. See a doctor at once if:   o You cough up blood  o You find blood in your stool (poop)  o You have a deep cut, or a cut that bleeds longer than 10 minutes   o You have a bad cut, hard fall, accident or hit your head (go to urgent care or the emergency room).    For women who can get pregnant: This medicine can harm an unborn baby. Be  "very careful not to get pregnant while taking this medicine. If you think you might be pregnant, call your doctor right away.    For more information, read \"Guide to Warfarin Therapy,  the booklet you received in the hospital.        Pending Results     Date and Time Order Name Status Description    2018 0716 Blood culture Preliminary     2018 0716 Blood culture Preliminary             Statement of Approval     Ordered          18 1020  I have reviewed and agree with all the recommendations and orders detailed in this document.  EFFECTIVE NOW     Approved and electronically signed by:  Art Hoover MD             Admission Information     Date & Time Provider Department Dept. Phone    2018 Kevin Sinclair MD [place] [address] [facility] [phone]      Your Vitals Were     Blood Pressure Pulse Temperature Respirations Height Weight    146/69 67 97.9  F (36.6  C) (Oral) 16 1.626 m (5' 4\") 62.8 kg (138 lb 7.2 oz)    Pulse Oximetry BMI (Body Mass Index)                97% 23.76 kg/m2          [employer] Information     [employer] lets you send messages to your doctor, view your test results, renew your prescriptions, schedule appointments and more. To sign up, go to [email or web address] . Click on \"Log in\" on the left side of the screen, which will take you to the Welcome page. Then click on \"Sign up Now\" on the right side of the page.     You will be asked to enter the access code listed below, as well as some personal information. Please follow the directions to create your username and password.     Your access code is: [email or web address]  Expires: 2018  3:41 PM     Your access code will  in 90 days. If you need help or a new code, please call your [facility] clinic or [phone].        Care EveryWhere ID     This is your Care EveryWhere ID. This could be used by other organizations to access your [facility] medical records  [id number]        Equal Access to " Services     [facility]: Hadii aad justin Raya, waaxda luqadaha, qaybta kaalmaiain ortiz, matthieu romero . So [facility] [phone].    ATENCIÓN: Si habla español, tiene a garcia disposición servicios gratuitos de asistencia lingüística. Llame al [phone].    We comply with applicable federal civil rights laws and [place] laws. We do not discriminate on the basis of race, color, national origin, age, disability, sex, sexual orientation, or gender identity.               Review of your medicines      START taking        Dose / Directions    levofloxacin 500 MG tablet   Commonly known as:  LEVAQUIN   Indication:  Community Acquired Pneumonia   Used for:  COPD exacerbation (H)        Dose:  500 mg   Start taking on:  9/29/2018   Take 1 tablet (500 mg) by mouth daily   Quantity:  4 tablet   Refills:  0       predniSONE 10 MG tablet   Commonly known as:  DELTASONE   Used for:  COPD exacerbation (H)        4 tabs daily for 3 days, then 3 tabs daily for 3 days, then 2 tabs daily for 3 days, then 1 tab daily for 3 days, then stop   Quantity:  30 tablet   Refills:  0         CONTINUE these medicines which may have CHANGED, or have new prescriptions. If we are uncertain of the size of tablets/capsules you have at home, strength may be listed as something that might have changed.        Dose / Directions    pantoprazole 40 MG EC tablet   Commonly known as:  PROTONIX   This may have changed:  when to take this   Used for:  Gastric ulcer        Dose:  40 mg   Take 1 tablet (40 mg) by mouth 2 times daily (before meals)   Quantity:  60 tablet   Refills:  3       * warfarin 2 MG tablet   Commonly known as:  COUMADIN   This may have changed:  You were already taking a medication with the same name, and this prescription was added. Make sure you understand how and when to take each.   Used for:  Atrial fibrillation, unspecified type (H)        Dose:  2 mg   Take 1 tablet (2 mg) by mouth once for 1  dose   Quantity:  30 tablet   Refills:  0       * warfarin 4 MG tablet   Commonly known as:  COUMADIN   This may have changed:  Another medication with the same name was added. Make sure you understand how and when to take each.   Used for:  H/O mitral valve repair        Dose:  4 mg   Start taking on:  9/29/2018   Take 1 tablet (4 mg) by mouth daily   Quantity:  30 tablet   Refills:  0       * Notice:  This list has 2 medication(s) that are the same as other medications prescribed for you. Read the directions carefully, and ask your doctor or other care provider to review them with you.      CONTINUE these medicines which have NOT CHANGED        Dose / Directions    fluticasone-salmeterol 250-50 MCG/DOSE diskus inhaler   Commonly known as:  ADVAIR   Notes to Patient:  Resume per pt        Dose:  1 puff   Inhale 1 puff into the lungs every 12 hours   Refills:  0       fluticasone-vilanterol 100-25 MCG/INH inhaler   Commonly known as:  BREO ELLIPTA        Dose:  1 puff   Inhale 1 puff into the lungs daily   Refills:  0       furosemide 40 MG tablet   Commonly known as:  LASIX   Notes to Patient:  Resume per pt        Dose:  80 mg   Take 80 mg by mouth   Refills:  0       METOPROLOL TARTRATE PO        Dose:  100 mg   Take 100 mg by mouth daily   Refills:  0       potassium chloride 10 MEQ CR capsule   Commonly known as:  MICRO-K   Notes to Patient:  Resume per pt        Dose:  20 mEq   Take 20 mEq by mouth daily   Refills:  0       umeclidinium 62.5 MCG/INH inhaler   Commonly known as:  INCRUSE ELLIPTA        Dose:  1 puff   Inhale 1 puff into the lungs daily   Refills:  0       VITAMIN D (CHOLECALCIFEROL) PO   Notes to Patient:  Resume per pt        Dose:  2000 Units   Take 2,000 Units by mouth daily   Refills:  0            Where to get your medicines      Some of these will need a paper prescription and others can be bought over the counter. Ask your nurse if you have questions.     You don't need a prescription  for these medications     levofloxacin 500 MG tablet    predniSONE 10 MG tablet    warfarin 2 MG tablet    warfarin 4 MG tablet                Protect others around you: Learn how to safely use, store and throw away your medicines at www.disposemymeds.org.        ANTIBIOTIC INSTRUCTION     You've Been Prescribed an Antibiotic - Now What?  Your healthcare team thinks that you or your loved one might have an infection. Some infections can be treated with antibiotics, which are powerful, life-saving drugs. Like all medications, antibiotics have side effects and should only be used when necessary. There are some important things you should know about your antibiotic treatment.      Your healthcare team may run tests before you start taking an antibiotic.    Your team may take samples (e.g., from your blood, urine or other areas) to run tests to look for bacteria. These test can be important to determine if you need an antibiotic at all and, if you do, which antibiotic will work best.      Within a few days, your healthcare team might change or even stop your antibiotic.    Your team may start you on an antibiotic while they are working to find out what is making you sick.    Your team might change your antibiotic because test results show that a different antibiotic would be better to treat your infection.    In some cases, once your team has more information, they learn that you do not need an antibiotic at all. They may find out that you don't have an infection, or that the antibiotic you're taking won't work against your infection. For example, an infection caused by a virus can't be treated with antibiotics. Staying on an antibiotic when you don't need it is more likely to be harmful than helpful.      You may experience side effects from your antibiotic.    Like all medications, antibiotics have side effects. Some of these can be serious.    Let you healthcare team know if you have any known allergies when you are  admitted to the hospital.    One significant side effect of nearly all antibiotics is the risk of severe and sometimes deadly diarrhea caused by Clostridium difficile (C. Difficile). This occurs when a person takes antibiotics because some good germs are destroyed. Antibiotic use allows C. diificile to take over, putting patients at high risk for this serious infection.    As a patient or caregiver, it is important to understand your or your loved one's antibiotic treatment. It is especially important for caregivers to speak up when patients can't speak for themselves. Here are some important questions to ask your healthcare team.    What infection is this antibiotic treating and how do you know I have that infection?    What side effects might occur from this antibiotic?    How long will I need to take this antibiotic?    Is it safe to take this antibiotic with other medications or supplements (e.g., vitamins) that I am taking?     Are there any special directions I need to know about taking this antibiotic? For example, should I take it with food?    How will I be monitored to know whether my infection is responding to the antibiotic?    What tests may help to make sure the right antibiotic is prescribed for me?      Information provided by:  www.cdc.gov/getsmart  U.S. Department of Health and Human Services  Centers for disease Control and Prevention  National Center for Emerging and Zoonotic Infectious Diseases  Division of Healthcare Quality Promotion             Medication List: This is a list of all your medications and when to take them. Check marks below indicate your daily home schedule. Keep this list as a reference.      Medications           Morning Afternoon Evening Bedtime As Needed    fluticasone-salmeterol 250-50 MCG/DOSE diskus inhaler   Commonly known as:  ADVAIR   Inhale 1 puff into the lungs every 12 hours   Notes to Patient:  Resume per pt                                fluticasone-vilanterol  100-25 MCG/INH inhaler   Commonly known as:  BREO ELLIPTA   Inhale 1 puff into the lungs daily   Last time this was given:  1 puff on 9/28/2018  9:11 AM            9/29/18                       furosemide 40 MG tablet   Commonly known as:  LASIX   Take 80 mg by mouth   Notes to Patient:  Resume per pt                                levofloxacin 500 MG tablet   Commonly known as:  LEVAQUIN   Take 1 tablet (500 mg) by mouth daily   Start taking on:  9/29/2018   Last time this was given:  500 mg on 9/28/2018  9:11 AM            9/29/18                       METOPROLOL TARTRATE PO   Take 100 mg by mouth daily   Last time this was given:  50 mg on 9/28/2018  9:11 AM            9/29/18                       pantoprazole 40 MG EC tablet   Commonly known as:  PROTONIX   Take 1 tablet (40 mg) by mouth 2 times daily (before meals)   Last time this was given:  40 mg on 9/28/2018  6:32 AM                    9/28/18               potassium chloride 10 MEQ CR capsule   Commonly known as:  MICRO-K   Take 20 mEq by mouth daily   Notes to Patient:  Resume per pt                                predniSONE 10 MG tablet   Commonly known as:  DELTASONE   4 tabs daily for 3 days, then 3 tabs daily for 3 days, then 2 tabs daily for 3 days, then 1 tab daily for 3 days, then stop   Last time this was given:  40 mg on 9/28/2018  9:11 AM            9/29/18                       umeclidinium 62.5 MCG/INH inhaler   Commonly known as:  INCRUSE ELLIPTA   Inhale 1 puff into the lungs daily   Last time this was given:  1 puff on 9/28/2018  9:11 AM            9/29/18                       VITAMIN D (CHOLECALCIFEROL) PO   Take 2,000 Units by mouth daily   Notes to Patient:  Resume per pt                                * warfarin 2 MG tablet   Commonly known as:  COUMADIN   Take 1 tablet (2 mg) by mouth once for 1 dose                                * warfarin 4 MG tablet   Commonly known as:  COUMADIN   Take 1 tablet (4 mg) by mouth daily   Start  taking on:  9/29/2018 9/29/18               * Notice:  This list has 2 medication(s) that are the same as other medications prescribed for you. Read the directions carefully, and ask your doctor or other care provider to review them with you.

## 2018-09-26 LAB
ANION GAP SERPL CALCULATED.3IONS-SCNC: 6 MMOL/L (ref 3–14)
BUN SERPL-MCNC: 46 MG/DL (ref 7–30)
CALCIUM SERPL-MCNC: 8.8 MG/DL (ref 8.5–10.1)
CHLORIDE SERPL-SCNC: 105 MMOL/L (ref 94–109)
CO2 SERPL-SCNC: 35 MMOL/L (ref 20–32)
CREAT SERPL-MCNC: 1.61 MG/DL (ref 0.52–1.04)
ERYTHROCYTE [DISTWIDTH] IN BLOOD BY AUTOMATED COUNT: 15.7 % (ref 10–15)
GFR SERPL CREATININE-BSD FRML MDRD: 30 ML/MIN/1.7M2
GLUCOSE BLDC GLUCOMTR-MCNC: 153 MG/DL (ref 70–99)
GLUCOSE SERPL-MCNC: 119 MG/DL (ref 70–99)
HCT VFR BLD AUTO: 30.7 % (ref 35–47)
HGB BLD-MCNC: 9.8 G/DL (ref 11.7–15.7)
INR PPP: 4.59 (ref 0.86–1.14)
MCH RBC QN AUTO: 31 PG (ref 26.5–33)
MCHC RBC AUTO-ENTMCNC: 31.9 G/DL (ref 31.5–36.5)
MCV RBC AUTO: 97 FL (ref 78–100)
PLATELET # BLD AUTO: 116 10E9/L (ref 150–450)
POTASSIUM SERPL-SCNC: 3.6 MMOL/L (ref 3.4–5.3)
RBC # BLD AUTO: 3.16 10E12/L (ref 3.8–5.2)
SODIUM SERPL-SCNC: 146 MMOL/L (ref 133–144)
WBC # BLD AUTO: 10.4 10E9/L (ref 4–11)

## 2018-09-26 PROCEDURE — 80048 BASIC METABOLIC PNL TOTAL CA: CPT | Performed by: HOSPITALIST

## 2018-09-26 PROCEDURE — 40000275 ZZH STATISTIC RCP TIME EA 10 MIN

## 2018-09-26 PROCEDURE — A9270 NON-COVERED ITEM OR SERVICE: HCPCS | Mod: GY | Performed by: INTERNAL MEDICINE

## 2018-09-26 PROCEDURE — 25000132 ZZH RX MED GY IP 250 OP 250 PS 637: Mod: GY | Performed by: INTERNAL MEDICINE

## 2018-09-26 PROCEDURE — 94640 AIRWAY INHALATION TREATMENT: CPT | Mod: 76

## 2018-09-26 PROCEDURE — 85027 COMPLETE CBC AUTOMATED: CPT | Performed by: HOSPITALIST

## 2018-09-26 PROCEDURE — 85610 PROTHROMBIN TIME: CPT | Performed by: HOSPITALIST

## 2018-09-26 PROCEDURE — 99232 SBSQ HOSP IP/OBS MODERATE 35: CPT | Performed by: INTERNAL MEDICINE

## 2018-09-26 PROCEDURE — 36415 COLL VENOUS BLD VENIPUNCTURE: CPT | Performed by: HOSPITALIST

## 2018-09-26 PROCEDURE — 00000146 ZZHCL STATISTIC GLUCOSE BY METER IP

## 2018-09-26 PROCEDURE — 94640 AIRWAY INHALATION TREATMENT: CPT

## 2018-09-26 PROCEDURE — 25000125 ZZHC RX 250: Performed by: HOSPITALIST

## 2018-09-26 PROCEDURE — 25000128 H RX IP 250 OP 636: Performed by: INTERNAL MEDICINE

## 2018-09-26 PROCEDURE — 12000007 ZZH R&B INTERMEDIATE

## 2018-09-26 RX ORDER — METOPROLOL TARTRATE 50 MG
50 TABLET ORAL DAILY
Status: DISCONTINUED | OUTPATIENT
Start: 2018-09-26 | End: 2018-09-28 | Stop reason: HOSPADM

## 2018-09-26 RX ORDER — PREDNISONE 20 MG/1
40 TABLET ORAL DAILY
Status: DISCONTINUED | OUTPATIENT
Start: 2018-09-27 | End: 2018-09-28 | Stop reason: HOSPADM

## 2018-09-26 RX ADMIN — IPRATROPIUM BROMIDE AND ALBUTEROL SULFATE 3 ML: 2.5; .5 SOLUTION RESPIRATORY (INHALATION) at 11:16

## 2018-09-26 RX ADMIN — FLUTICASONE FUROATE AND VILANTEROL TRIFENATATE 1 PUFF: 100; 25 POWDER RESPIRATORY (INHALATION) at 09:08

## 2018-09-26 RX ADMIN — UMECLIDINIUM 1 PUFF: 62.5 AEROSOL, POWDER ORAL at 09:08

## 2018-09-26 RX ADMIN — IPRATROPIUM BROMIDE AND ALBUTEROL SULFATE 3 ML: 2.5; .5 SOLUTION RESPIRATORY (INHALATION) at 20:25

## 2018-09-26 RX ADMIN — IPRATROPIUM BROMIDE AND ALBUTEROL SULFATE 3 ML: 2.5; .5 SOLUTION RESPIRATORY (INHALATION) at 15:51

## 2018-09-26 RX ADMIN — IPRATROPIUM BROMIDE AND ALBUTEROL SULFATE 3 ML: 2.5; .5 SOLUTION RESPIRATORY (INHALATION) at 07:35

## 2018-09-26 RX ADMIN — METOPROLOL TARTRATE 50 MG: 50 TABLET ORAL at 16:24

## 2018-09-26 RX ADMIN — METHYLPREDNISOLONE SODIUM SUCCINATE 62.5 MG: 125 INJECTION, POWDER, FOR SOLUTION INTRAMUSCULAR; INTRAVENOUS at 12:39

## 2018-09-26 RX ADMIN — IPRATROPIUM BROMIDE AND ALBUTEROL SULFATE 3 ML: 2.5; .5 SOLUTION RESPIRATORY (INHALATION) at 00:26

## 2018-09-26 RX ADMIN — IPRATROPIUM BROMIDE AND ALBUTEROL SULFATE 3 ML: 2.5; .5 SOLUTION RESPIRATORY (INHALATION) at 23:55

## 2018-09-26 RX ADMIN — PANTOPRAZOLE SODIUM 40 MG: 40 TABLET, DELAYED RELEASE ORAL at 09:05

## 2018-09-26 ASSESSMENT — ACTIVITIES OF DAILY LIVING (ADL)
ADLS_ACUITY_SCORE: 11

## 2018-09-26 ASSESSMENT — PAIN DESCRIPTION - DESCRIPTORS: DESCRIPTORS: SORE

## 2018-09-26 NOTE — PROGRESS NOTES
Nebs given as ordered. LS are clear and diminished t/o and pt is on 4L. Will continue to follow.  Usama Young

## 2018-09-26 NOTE — PLAN OF CARE
Problem: Patient Care Overview  Goal: Plan of Care/Patient Progress Review  Outcome: Improving  Patient is alert and oriented X4. Vital signs within defined limits ex. BP can be soft at times, 90's/50's. HR irregular, atrial fibrillation at baseline. No complaints of pain. Patient off BiPAP at 1030 am, sats stable on 4L oxygen via nasal cannula which is baseline for the patient. Lung sounds course with wheezes. IS to 750, will continue to encourage. No c/o pain. Stand by assist with ambulation. Bowel sounds active, passing flatus. Adequate urine output. tolerating regular diet.

## 2018-09-26 NOTE — PROGRESS NOTES
[facility]    Hospitalist Progress Note    Assessment & Plan   88-year-old female with past medical history of COPD on chronic oxygen, lung cancer status post radiation and lobectomy, mitral valve replacement, atrial fibrillation with pacemaker on chronic Coumadin, pulmonary hypertension and tricuspid regurgitation who presents with worsening dyspnea, suspected consistent with chronic obstructive pulmonary disease exacerbation.       Chronic obstructive pulmonary disease acute exacerbation  Probable pneumonia  -Patient on home oxygen with baseline of 4 L, initially required BiPAP.    -Weaned off BiPAP currently on on 4 L via nasal cannula which is her baseline.  -Continue prior to admission inhalers  -Duo nebs every 4 hour.   -Change IV Solu-Medrol to prednisone 40 mg p.o. daily  -Chest x-ray with notable infiltrates in the right lung which is in the area where she had prior carcinoma.    -Repeat chest x-ray in 1 month as outpatient  -continue on Levaquin for now    Atrial fibrillation on chronic Coumadin:  -INR is still supratherapeutic   -pharmacy to dose Coumadin.  -Hold prior to admission metoprolol given borderline blood pressures.    Hypertension:    -Resume prior to admission metoprolol at 50 mg daily  -Hold Lasix for now due to slightly worsening creatinine.    Chronic kidney disease stage III  -Baseline creatinine appears to be around 1.1-1.3  -Creatinine marginally worse at 1.6 today, continue to hold Lasix, recheck creatinine in the morning.    History of peptic ulcer disease:  -Continue prior to admission PPI    Left neck pain:  -She is endorsing pain in left upper neck over her left parotid gland since this morning, mildly tender to palpation  -No obvious parotid gland swelling, continue to monitor for now, symptomatic treatment with Tylenol.    # Pain Assessment:  Current Pain Score 9/26/2018   Patient currently in pain? denies   Pain score (0-10) -   Neda head pain level was assessed  and she currently denies pain.        D/W: RN  DVT Prophylaxis: Warfarin  Code Status: DNR/DNI    Disposition: Expected discharge in 1-2 days    Art Hoover MD    Interval History   Dyspnea much improved.  Complaining of some left-sided upper neck pain.  Afebrile.  No earache, no fullness of the ear.  Denies pain in oral cavity or difficulty swallowing.    -Data reviewed today: I reviewed all new labs and imaging results over the last 24 hours. I personally reviewed no images or EKG's today.      Physical Exam   Temp: 98.2  F (36.8  C) Temp src: Oral BP: 122/54 Pulse: 78 Heart Rate: 83 Resp: 20 SpO2: 95 % O2 Device: Nasal cannula Oxygen Delivery: 4 LPM  Vitals:    09/25/18 0800   Weight: 62.6 kg (138 lb)     Vital Signs with Ranges  Temp:  [97.7  F (36.5  C)-98.6  F (37  C)] 98.2  F (36.8  C)  Pulse:  [68-78] 78  Heart Rate:  [69-83] 83  Resp:  [18-22] 20  BP: ()/(45-62) 122/54  SpO2:  [93 %-96 %] 95 %  I/O last 3 completed shifts:  In: 510 [P.O.:510]  Out: 400 [Urine:400]    Constitutional: AAOX3, NAD  HEENT: Moist oral mucosa, no oral lesions, No pallor or icterus  Neck-slightly tender over the left parotid gland, no obvious swelling  Respiratory: Scattered wheeze bilaterally, Normal WOB  Cardiovascular: RRR, No murmur  GI: Soft, Non- tender, BS- normoactive,   Skin/Integument: Warm and dry, no rashes  MSK: No joint deformity or swelling, trace edema  Neuro: CN- grossly intact        Medications       - MEDICATION INSTRUCTIONS -       Warfarin Therapy Reminder           fluticasone-vilanterol  1 puff Inhalation Daily     ipratropium - albuterol 0.5 mg/2.5 mg/3 mL  3 mL Nebulization Q4H     [START ON 9/27/2018] levofloxacin  750 mg Intravenous Q48H     LORazepam  0.5 mg Intravenous Once     methylPREDNISolone  62.5 mg Intravenous Q24H     pantoprazole  40 mg Oral QAM AC     sodium chloride (PF)  3 mL Intracatheter Q8H     umeclidinium  1 puff Inhalation Daily     warfarin-No DOSE today  1 each Does not  apply no dose today (warfarin)       Data       Recent Labs  Lab 09/26/18  0550 09/25/18  0451   WBC 10.4 8.7   HGB 9.8* 11.7   MCV 97 100   * 155   INR 4.59* 3.26*   * 144   POTASSIUM 3.6 4.0   CHLORIDE 105 103   CO2 35* 37*   BUN 46* 30   CR 1.61* 1.38*   ANIONGAP 6 4   JENNIE 8.8 9.1   * 178*   TROPI  --  <0.015       No results found for this or any previous visit (from the past 24 hour(s)).

## 2018-09-26 NOTE — PLAN OF CARE
Problem: Patient Care Overview  Goal: Plan of Care/Patient Progress Review  Outcome: No Change  A/O, VSS, O2sats 95% on 4 L nc, mild SOB, pt states baseline. 1/10 pain at L jaw with mild swelling at site, pt states she is sneezing more and feels these may be related, does not worsen when moving jaw. Ax1, steady, sitting edge of bed. Lasix held d/t elevated creatinine per MD. Plan for possible discharge tomorrow pending lab rechecks.

## 2018-09-26 NOTE — PLAN OF CARE
Problem: Patient Care Overview  Goal: Plan of Care/Patient Progress Review  Outcome: No Change  Pt A/O x4. Up w/ 1A, GBW. VSS ex soft Bp. On 4L NC. LS diminished and shallow. BS active. CMS intact. No C/o CP. Denies pain. HR irregular, baseline A-fib. Plan is to continue intermittent abx, w/ potential to discharge today. Will continue to monitor.

## 2018-09-26 NOTE — PROGRESS NOTES
Pt refused BIPAP for tonight. Pt is stable on 4LNC, BBS clear, No shortness of breath reported per pt. schedule nebs given as ordered. Will continue to follow pt.  9/26/2018  Patricia Schwartz

## 2018-09-26 NOTE — CONSULTS
Care Transition Initial Assessment - RN        Met with: Patient and Family, daughter Lisa  DATA   Active Problems:    COPD exacerbation (H)       Cognitive Status: awake, alert and oriented.        Contact information and PCP information verified: Yes  Lives With: alone      Insurance concerns: No Insurance issues identified  ASSESSMENT  Patient currently receives the following services:  None, did have home care before        Identified issues/concerns regarding health management: she has questions for her MD regarding the antibiotics and regarding left neck gland pain    PLAN  Financial costs for the patient include per insurance .  Patient given options and choices for discharge yes, explained rational for C RN at discharge patient given choice and elected [facility] .  Patient/family is agreeable to the plan?  Yes:   Patient anticipates discharging to home .        Patient anticipates needs for home equipment: Yes, family will need to bring in portable oxygen tank for discharge.   Plan/Disposition: Home with resumption of home oxygen through [facility] and new Home RN referral through [facility].   Appointments:   To be arranged.     Care  (CTS) will continue to follow as needed.

## 2018-09-27 ENCOUNTER — APPOINTMENT (OUTPATIENT)
Dept: PHYSICAL THERAPY | Facility: CLINIC | Age: 83
DRG: 190 | End: 2018-09-27
Attending: INTERNAL MEDICINE
Payer: MEDICARE

## 2018-09-27 LAB
ANION GAP SERPL CALCULATED.3IONS-SCNC: 6 MMOL/L (ref 3–14)
BUN SERPL-MCNC: 52 MG/DL (ref 7–30)
CALCIUM SERPL-MCNC: 9 MG/DL (ref 8.5–10.1)
CHLORIDE SERPL-SCNC: 103 MMOL/L (ref 94–109)
CO2 SERPL-SCNC: 33 MMOL/L (ref 20–32)
CREAT SERPL-MCNC: 1.42 MG/DL (ref 0.52–1.04)
ERYTHROCYTE [DISTWIDTH] IN BLOOD BY AUTOMATED COUNT: 16.2 % (ref 10–15)
GFR SERPL CREATININE-BSD FRML MDRD: 35 ML/MIN/1.7M2
GLUCOSE SERPL-MCNC: 168 MG/DL (ref 70–99)
HCT VFR BLD AUTO: 30.9 % (ref 35–47)
HGB BLD-MCNC: 9.9 G/DL (ref 11.7–15.7)
INR PPP: 4.1 (ref 0.86–1.14)
MCH RBC QN AUTO: 31.1 PG (ref 26.5–33)
MCHC RBC AUTO-ENTMCNC: 32 G/DL (ref 31.5–36.5)
MCV RBC AUTO: 97 FL (ref 78–100)
PLATELET # BLD AUTO: 118 10E9/L (ref 150–450)
POTASSIUM SERPL-SCNC: 4.3 MMOL/L (ref 3.4–5.3)
RBC # BLD AUTO: 3.18 10E12/L (ref 3.8–5.2)
SODIUM SERPL-SCNC: 142 MMOL/L (ref 133–144)
WBC # BLD AUTO: 9.4 10E9/L (ref 4–11)

## 2018-09-27 PROCEDURE — 85027 COMPLETE CBC AUTOMATED: CPT | Performed by: INTERNAL MEDICINE

## 2018-09-27 PROCEDURE — 97530 THERAPEUTIC ACTIVITIES: CPT | Mod: GP | Performed by: PHYSICAL THERAPIST

## 2018-09-27 PROCEDURE — 25000125 ZZHC RX 250: Performed by: INTERNAL MEDICINE

## 2018-09-27 PROCEDURE — A9270 NON-COVERED ITEM OR SERVICE: HCPCS | Mod: GY | Performed by: INTERNAL MEDICINE

## 2018-09-27 PROCEDURE — 85610 PROTHROMBIN TIME: CPT | Performed by: INTERNAL MEDICINE

## 2018-09-27 PROCEDURE — 97161 PT EVAL LOW COMPLEX 20 MIN: CPT | Mod: GP | Performed by: PHYSICAL THERAPIST

## 2018-09-27 PROCEDURE — 36415 COLL VENOUS BLD VENIPUNCTURE: CPT | Performed by: INTERNAL MEDICINE

## 2018-09-27 PROCEDURE — 40000275 ZZH STATISTIC RCP TIME EA 10 MIN

## 2018-09-27 PROCEDURE — 94640 AIRWAY INHALATION TREATMENT: CPT

## 2018-09-27 PROCEDURE — 25000125 ZZHC RX 250: Performed by: HOSPITALIST

## 2018-09-27 PROCEDURE — 40000193 ZZH STATISTIC PT WARD VISIT: Performed by: PHYSICAL THERAPIST

## 2018-09-27 PROCEDURE — 99232 SBSQ HOSP IP/OBS MODERATE 35: CPT | Performed by: INTERNAL MEDICINE

## 2018-09-27 PROCEDURE — 12000007 ZZH R&B INTERMEDIATE

## 2018-09-27 PROCEDURE — 80048 BASIC METABOLIC PNL TOTAL CA: CPT | Performed by: INTERNAL MEDICINE

## 2018-09-27 PROCEDURE — 94640 AIRWAY INHALATION TREATMENT: CPT | Mod: 76

## 2018-09-27 PROCEDURE — 25000128 H RX IP 250 OP 636: Performed by: HOSPITALIST

## 2018-09-27 PROCEDURE — 25000132 ZZH RX MED GY IP 250 OP 250 PS 637: Mod: GY | Performed by: INTERNAL MEDICINE

## 2018-09-27 PROCEDURE — 97116 GAIT TRAINING THERAPY: CPT | Mod: GP | Performed by: PHYSICAL THERAPIST

## 2018-09-27 RX ORDER — LEVOFLOXACIN 500 MG/1
500 TABLET, FILM COATED ORAL DAILY
Status: DISCONTINUED | OUTPATIENT
Start: 2018-09-28 | End: 2018-09-28 | Stop reason: HOSPADM

## 2018-09-27 RX ORDER — LIDOCAINE 40 MG/G
CREAM TOPICAL
Status: DISPENSED
Start: 2018-09-27 | End: 2018-09-28

## 2018-09-27 RX ADMIN — IPRATROPIUM BROMIDE AND ALBUTEROL SULFATE 3 ML: 2.5; .5 SOLUTION RESPIRATORY (INHALATION) at 07:11

## 2018-09-27 RX ADMIN — UMECLIDINIUM 1 PUFF: 62.5 AEROSOL, POWDER ORAL at 08:13

## 2018-09-27 RX ADMIN — FLUTICASONE FUROATE AND VILANTEROL TRIFENATATE 1 PUFF: 100; 25 POWDER RESPIRATORY (INHALATION) at 08:13

## 2018-09-27 RX ADMIN — METOPROLOL TARTRATE 50 MG: 50 TABLET ORAL at 08:09

## 2018-09-27 RX ADMIN — IPRATROPIUM BROMIDE AND ALBUTEROL SULFATE 3 ML: 2.5; .5 SOLUTION RESPIRATORY (INHALATION) at 14:54

## 2018-09-27 RX ADMIN — PREDNISONE 40 MG: 20 TABLET ORAL at 08:09

## 2018-09-27 RX ADMIN — PANTOPRAZOLE SODIUM 40 MG: 40 TABLET, DELAYED RELEASE ORAL at 07:03

## 2018-09-27 RX ADMIN — IPRATROPIUM BROMIDE AND ALBUTEROL SULFATE 3 ML: 2.5; .5 SOLUTION RESPIRATORY (INHALATION) at 11:30

## 2018-09-27 RX ADMIN — LEVOFLOXACIN 750 MG: 5 INJECTION, SOLUTION INTRAVENOUS at 04:34

## 2018-09-27 RX ADMIN — IPRATROPIUM BROMIDE AND ALBUTEROL SULFATE 3 ML: 2.5; .5 SOLUTION RESPIRATORY (INHALATION) at 20:07

## 2018-09-27 ASSESSMENT — ACTIVITIES OF DAILY LIVING (ADL)
ADLS_ACUITY_SCORE: 11

## 2018-09-27 NOTE — PLAN OF CARE
Problem: Patient Care Overview  Goal: Plan of Care/Patient Progress Review  Alert and oriented x4. Vital signs stable on 4L nasal cannula. Assist of 1. Tolerating regular diet. Lung sounds diminished, crackles on right. Bowel sounds active, + flatus. Voiding adequately. Feet dusky. Left jaw pain - declined interventions. Denies nausea.   Discharge Planner PT   Patient plan for discharge: TCU; prefers [facility]  Current status: PT: Order received; Initial evaluation completed and treatment initiated. Patient admitted from home with a COPD exacerbation and suspected pneumonia; At baseline patient is on 4L of O2 and limited to primarily household distances; reports a recent decline of ability secondary to breathing. Has a 4WW and wheelchair available if needed but has been ambulatory in her home without need for them; no recent falls. On eval patient noted to have functional weakness and decreased activity tolerance (worse than baseline per patient); SBA to independent with bed mobility; CGA/SBA for gait/transfers; able to ambulate 75 feet with 4 standing rests secondary to SOB; Reports she doesn't feel she would be able to manage independently at home in her current condition. VSS; O2 sats 97% at rest and 90% after walking; /65; HR 71  Barriers to return to prior living situation: stairs; decreased activity tolerance; weakness  Recommendations for discharge: TCU  Rationale for recommendations: Patient would benefit from ongoing PT to address deficits with weakness, poor activity tolerance and impaired independence with functional mobility; Anticipate after a brief TCU stay patient will be able to return home alone independently again.       Entered by: Anni Chu 09/27/2018 10:05 AM

## 2018-09-27 NOTE — CONSULTS
SW:     [facility] consulted as it is now recommended that pt discharge to TCU. RN Care coordinator met with pt previously to discuss home care and followed up today, pt voiced [facility] as her preferred facility. HOMERO faxed referral via DOD. Pt was admitted on 9/25/18 and does not have qualifying TCU stay until 9/28/18. Hospitalist to assess yet today, yesterday anticipated 1-2 more days. [facility] contacted this writer and facility is able to accept.    ADDENDUM: Pt is anticipated to discharge tomorrow, 9/28/18, if medically appropriate. HOMERO updated [facility] Home admissions and there will be a bed available for pt 9/28/18.    1615: Private room available for pt at [facility] per request, pt made aware of private room fee and is agreeable. Pt daughter will plan to transport around 1400 tomorrow. SW to contact daughter if any changes in plan occur in AM.    MEGHANN Hayden, [facility]  Daytime (8:00am-4:30pm): [phone]  After-Hours [facility] Pager (4:30pm-11:30pm): [phone]

## 2018-09-27 NOTE — PLAN OF CARE
Problem: Patient Care Overview  Goal: Plan of Care/Patient Progress Review  Alert and oriented x4. Vital signs stable on 4L nasal cannula. Assist of 1. Tolerating regular diet. Lung sounds diminished, crackles on right. Bowel sounds active, + flatus. Voiding adequately. Feet dusky. Left jaw pain - declined interventions. Denies nausea.   Pt a/o x 4. VSS on 4 L NC O2. Denies pain. Improved breathing with activity. PT moy is recommending TCU as pt lives alone. Plan to keep one more night and discharge to TCU tomorrow. Tolerating regular diet, up with SBA.

## 2018-09-27 NOTE — PLAN OF CARE
Problem: Patient Care Overview  Goal: Plan of Care/Patient Progress Review  Alert and oriented x4. Vital signs stable on 4L nasal cannula. Assist of 1. Tolerating regular diet. Lung sounds diminished, crackles on right. Bowel sounds active, + flatus. Voiding adequately. Feet dusky. Left jaw pain - declined interventions. Denies nausea.   A+Ox4. VSS on 4L NC, pt baseline. LS diminished. Denies SOB. Denies pain. CMS intact. +BS. +Gas. Voiding. Regular diet. Up with Ax1.

## 2018-09-27 NOTE — PLAN OF CARE
Problem: Patient Care Overview  Goal: Plan of Care/Patient Progress Review  Alert and oriented x4. Vital signs stable on 4L nasal cannula. Assist of 1. Tolerating regular diet. Lung sounds diminished, crackles on right. Bowel sounds active, + flatus. Voiding adequately. Feet dusky. Left jaw pain - declined interventions. Denies nausea.  Outcome: Improving  Alert and oriented x4. Vital signs stable on 4L nasal cannula. Assist of 1. Tolerating regular diet. Lung sounds diminished, crackles on right. Bowel sounds active, + flatus. Voiding adequately. Feet dusky. Left jaw pain - declined interventions. Denies nausea.

## 2018-09-27 NOTE — PROGRESS NOTES
09/27/18 0933   Quick Adds   Type of Visit Initial PT Evaluation   Living Environment   Lives With alone   Living Arrangements house   Home Accessibility stairs to enter home;stairs within home;bed not on first floor   Number of Stairs to Enter Home 0   Number of Stairs Within Home 11   Stair Railings at Home inside, present on left side   Transportation Available car;family or friend will provide  (patient drives at baseline)   Living Environment Comment only uses steps when returning to bed   Self-Care   Usual Activity Tolerance moderate   Current Activity Tolerance fair   Regular Exercise no  (limited by breathing)   Equipment Currently Used at Home oxygen;none  (4L; has walker and wheelchair if needed)   Activity/Exercise/Self-Care Comment independent with mobility; gets help with cleaning   Functional Level Prior   Ambulation 0-->independent   Transferring 0-->independent   Toileting 0-->independent   Bathing 0-->independent   Dressing 0-->independent   Eating 0-->independent   Communication 0-->understands/communicates without difficulty   Swallowing 0-->swallows foods/liquids without difficulty   Cognition 1 - attention or memory deficits   Fall history within last six months no   Which of the above functional risks had a recent onset or change? ambulation  (decreased activity tolerance)   Prior Functional Level Comment normally independent with mobility; limited to household primarily secondary to breathing   General Information   Onset of Illness/Injury or Date of Surgery - Date 09/25/18   Referring Physician Art Hoover MD   Patient/Family Goals Statement go to TCU at [facility]   Pertinent History of Current Problem (include personal factors and/or comorbidities that impact the POC) per chart: 88-year-old female with past medical history of COPD on chronic oxygen, lung cancer status post radiation and lobectomy, mitral valve replacement, atrial fibrillation with pacemaker on chronic Coumadin,  pulmonary hypertension and tricuspid regurgitation who presents with worsening dyspnea, suspected consistent with chronic obstructive pulmonary disease exacerbation and probable pneumonia; see medical record for further information   Precautions/Limitations fall precautions;oxygen therapy device and L/min  (4L)   General Observations patient in bed; agreeable to PT eval   General Info Comments Activity: up with assist   Cognitive Status Examination   Orientation orientation to person, place and time   Level of Consciousness alert   Follows Commands and Answers Questions 100% of the time   Personal Safety and Judgment intact   Memory intact   Cognitive Comment appears intact during eval; reports some decline   Pain Assessment   Patient Currently in Pain No   Posture    Posture Comments WFL   Range of Motion (ROM)   ROM Comment WFL   Strength   Strength Comments functional weakness noted with mobility;  below baseline per patient report   Bed Mobility   Bed Mobility Comments SBA with HOB elevated and use of bedrail   Transfer Skills   Transfer Comments sit<>stand with CGA/SBA and safety cues; use of walker; reports knees feel weak   Gait   Gait Comments able to ambulate > 25 feet with CGA; feels she needs a walker currently secondary to LE's feeling weak   Balance   Balance Comments current need for a walker   Sensory Examination   Sensory Perception Comments intact   General Therapy Interventions   Planned Therapy Interventions gait training;strengthening;transfer training   Intervention Comments progress activity tolerance   Clinical Impression   Criteria for Skilled Therapeutic Intervention yes, treatment indicated   PT Diagnosis weakness; impaired transfers/gait   Influenced by the following impairments decreased activity tolerance; functional weakness; impaired balance   Functional limitations due to impairments impaired independence with functional mobility   Clinical Presentation Stable/Uncomplicated   Clinical  "Presentation Rationale min/CGA or less; lives alone; medically getting stable   Clinical Decision Making (Complexity) Low complexity   Therapy Frequency` daily   Predicted Duration of Therapy Intervention (days/wks) 3 days   Anticipated Equipment Needs at Discharge front wheeled walker   Anticipated Discharge Disposition Transitional Care Facility   Risk & Benefits of therapy have been explained Yes   Patient, Family & other staff in agreement with plan of care Yes   [facility] TM \"6 Clicks\"   2016, Trustees of [facility], under license to [employer].  All rights reserved.   6 Clicks Short Forms Basic Mobility Inpatient Short Form   [facility] AM-PAC  \"6 Clicks\" V.2 Basic Mobility Inpatient Short Form   1. Turning from your back to your side while in a flat bed without using bedrails? 4 - None   2. Moving from lying on your back to sitting on the side of a flat bed without using bedrails? 3 - A Little   3. Moving to and from a bed to a chair (including a wheelchair)? 3 - A Little   4. Standing up from a chair using your arms (e.g., wheelchair, or bedside chair)? 3 - A Little   5. To walk in hospital room? 3 - A Little   6. Climbing 3-5 steps with a railing? 3 - A Little   Basic Mobility Raw Score (Score out of 24.Lower scores equate to lower levels of function) 19   Total Evaluation Time   Total Evaluation Time (Minutes) 10     "

## 2018-09-27 NOTE — PROGRESS NOTES
[facility]    Hospitalist Progress Note    Assessment & Plan   88-year-old female with past medical history of COPD on chronic oxygen, lung cancer status post radiation and lobectomy, mitral valve replacement, atrial fibrillation with pacemaker on chronic Coumadin, pulmonary hypertension and tricuspid regurgitation who presents with worsening dyspnea, suspected consistent with chronic obstructive pulmonary disease exacerbation.       Chronic obstructive pulmonary disease acute exacerbation  Probable pneumonia  -Patient on home oxygen with baseline of 4 L, initially required BiPAP.    -Weaned off BiPAP currently on on 4 L via nasal cannula which is her baseline.  -Continue prior to admission inhalers  -Duo nebs every 4 hour.   -Continue prednisone 40 mg daily  -Chest x-ray with notable infiltrates in the right lung which is in the area where she had prior carcinoma.    -Repeat chest x-ray in 1 month as outpatient  -Switch Levaquin to p.o. starting 9/28  -Physical therapy recommending TCU    Atrial fibrillation on chronic Coumadin:  -INR is still supratherapeutic, likely due to concomitant Levaquin use  -pharmacy to dose Coumadin.  -Continue prior to admission metoprolol at a reduced dose of 50 mg daily, adjust according to blood pressures.    Hypertension:    -Continue metoprolol at 50 mg daily for now  -Hold Lasix 1 more day, resume in the morning.    Chronic kidney disease stage III  -Baseline creatinine appears to be around 1.1-1.3  -Creatinine was marginally worse at 1.6 yesterday, improved today to 1.4.    History of peptic ulcer disease:  -Continue prior to admission PPI    Left neck pain:  -She is endorsing pain in left upper neck over her left parotid gland since this morning, mildly tender to palpation  -No obvious parotid gland swelling  -Resolved    # Pain Assessment:  Current Pain Score 9/27/2018   Patient currently in pain? denies   Pain score (0-10) -   Pain location -   Pain  descriptors -   Neda s pain level was assessed and she currently denies pain.        D/W: RN  DVT Prophylaxis: Warfarin  Code Status: DNR/DNI    Disposition: Expected discharge 9/28, to TCU  Art Hoover MD    Interval History   Dyspnea continues to improve.  Left-sided upper neck pain-resolved.  Afebrile.      -Data reviewed today: I reviewed all new labs and imaging results over the last 24 hours. I personally reviewed no images or EKG's today.      Physical Exam   Temp: 97.6  F (36.4  C) Temp src: Oral BP: 135/74 Pulse: 69 Heart Rate: 77 Resp: 16 SpO2: 95 % O2 Device: Nasal cannula Oxygen Delivery: 4 LPM  Vitals:    09/25/18 0800 09/27/18 0445   Weight: 62.6 kg (138 lb) 62.8 kg (138 lb 7.2 oz)     Vital Signs with Ranges  Temp:  [97.6  F (36.4  C)-98.2  F (36.8  C)] 97.6  F (36.4  C)  Pulse:  [61-69] 69  Heart Rate:  [69-80] 77  Resp:  [16] 16  BP: (109-135)/(58-74) 135/74  SpO2:  [93 %-97 %] 95 %  I/O last 3 completed shifts:  In: 760 [P.O.:760]  Out: -     Constitutional: AAOX3, NAD  HEENT: Moist oral mucosa, no oral lesions, No pallor or icterus  Neck-no neck tenderness today.  Respiratory: Mixed at the bases bilaterally, no active wheezes, normal WOB  Cardiovascular: RRR, No murmur  GI: Soft, Non- tender, BS- normoactive,   Skin/Integument: Warm and dry, no rashes  MSK: No joint deformity or swelling, trace edema  Neuro: CN- grossly intact        Medications       - MEDICATION INSTRUCTIONS -       Warfarin Therapy Reminder           fluticasone-vilanterol  1 puff Inhalation Daily     ipratropium - albuterol 0.5 mg/2.5 mg/3 mL  3 mL Nebulization Q4H     [START ON 9/28/2018] levofloxacin  500 mg Oral Daily     lidocaine         LORazepam  0.5 mg Intravenous Once     metoprolol tartrate (LOPRESSOR) tablet 50 mg  50 mg Oral Daily     pantoprazole  40 mg Oral QAM AC     predniSONE  40 mg Oral Daily     sodium chloride (PF)  3 mL Intracatheter Q8H     umeclidinium  1 puff Inhalation Daily     warfarin-No  DOSE today  1 each Does not apply no dose today (warfarin)       Data       Recent Labs  Lab 09/27/18  0547 09/26/18  0550 09/25/18  0451   WBC 9.4 10.4 8.7   HGB 9.9* 9.8* 11.7   MCV 97 97 100   * 116* 155   INR 4.10* 4.59* 3.26*    146* 144   POTASSIUM 4.3 3.6 4.0   CHLORIDE 103 105 103   CO2 33* 35* 37*   BUN 52* 46* 30   CR 1.42* 1.61* 1.38*   ANIONGAP 6 6 4   JENNIE 9.0 8.8 9.1   * 119* 178*   TROPI  --   --  <0.015       No results found for this or any previous visit (from the past 24 hour(s)).

## 2018-09-28 VITALS
HEIGHT: 64 IN | DIASTOLIC BLOOD PRESSURE: 75 MMHG | BODY MASS INDEX: 23.64 KG/M2 | OXYGEN SATURATION: 97 % | WEIGHT: 138.45 LBS | RESPIRATION RATE: 16 BRPM | HEART RATE: 67 BPM | SYSTOLIC BLOOD PRESSURE: 136 MMHG | TEMPERATURE: 97.9 F

## 2018-09-28 LAB
BUN SERPL-MCNC: 54 MG/DL (ref 7–30)
CREAT SERPL-MCNC: 1.38 MG/DL (ref 0.52–1.04)
GFR SERPL CREATININE-BSD FRML MDRD: 36 ML/MIN/1.7M2
INR PPP: 2.52 (ref 0.86–1.14)

## 2018-09-28 PROCEDURE — 99239 HOSP IP/OBS DSCHRG MGMT >30: CPT | Performed by: INTERNAL MEDICINE

## 2018-09-28 PROCEDURE — A9270 NON-COVERED ITEM OR SERVICE: HCPCS | Mod: GY | Performed by: INTERNAL MEDICINE

## 2018-09-28 PROCEDURE — 85610 PROTHROMBIN TIME: CPT | Performed by: INTERNAL MEDICINE

## 2018-09-28 PROCEDURE — 82565 ASSAY OF CREATININE: CPT | Performed by: INTERNAL MEDICINE

## 2018-09-28 PROCEDURE — 36415 COLL VENOUS BLD VENIPUNCTURE: CPT | Performed by: INTERNAL MEDICINE

## 2018-09-28 PROCEDURE — 84520 ASSAY OF UREA NITROGEN: CPT | Performed by: INTERNAL MEDICINE

## 2018-09-28 PROCEDURE — 25000125 ZZHC RX 250: Performed by: INTERNAL MEDICINE

## 2018-09-28 PROCEDURE — 40000275 ZZH STATISTIC RCP TIME EA 10 MIN

## 2018-09-28 PROCEDURE — 25000125 ZZHC RX 250: Performed by: HOSPITALIST

## 2018-09-28 PROCEDURE — 94640 AIRWAY INHALATION TREATMENT: CPT

## 2018-09-28 PROCEDURE — 25000132 ZZH RX MED GY IP 250 OP 250 PS 637: Mod: GY | Performed by: INTERNAL MEDICINE

## 2018-09-28 PROCEDURE — 94640 AIRWAY INHALATION TREATMENT: CPT | Mod: 76

## 2018-09-28 RX ORDER — WARFARIN SODIUM 2 MG/1
2 TABLET ORAL
Status: DISCONTINUED | OUTPATIENT
Start: 2018-09-28 | End: 2018-09-28 | Stop reason: HOSPADM

## 2018-09-28 RX ORDER — PREDNISONE 10 MG/1
TABLET ORAL
Qty: 30 TABLET | Refills: 0 | DISCHARGE
Start: 2018-09-28

## 2018-09-28 RX ORDER — WARFARIN SODIUM 4 MG/1
4 TABLET ORAL DAILY
Qty: 30 TABLET | DISCHARGE
Start: 2018-09-29

## 2018-09-28 RX ORDER — WARFARIN SODIUM 2 MG/1
2 TABLET ORAL ONCE
Qty: 30 TABLET | DISCHARGE
Start: 2018-09-28 | End: 2018-09-28

## 2018-09-28 RX ORDER — LEVOFLOXACIN 500 MG/1
500 TABLET, FILM COATED ORAL DAILY
Qty: 4 TABLET | Refills: 0 | DISCHARGE
Start: 2018-09-29

## 2018-09-28 RX ADMIN — FLUTICASONE FUROATE AND VILANTEROL TRIFENATATE 1 PUFF: 100; 25 POWDER RESPIRATORY (INHALATION) at 09:11

## 2018-09-28 RX ADMIN — IPRATROPIUM BROMIDE AND ALBUTEROL SULFATE 3 ML: 2.5; .5 SOLUTION RESPIRATORY (INHALATION) at 11:01

## 2018-09-28 RX ADMIN — METOPROLOL TARTRATE 50 MG: 50 TABLET ORAL at 09:11

## 2018-09-28 RX ADMIN — UMECLIDINIUM 1 PUFF: 62.5 AEROSOL, POWDER ORAL at 09:11

## 2018-09-28 RX ADMIN — IPRATROPIUM BROMIDE AND ALBUTEROL SULFATE 3 ML: 2.5; .5 SOLUTION RESPIRATORY (INHALATION) at 07:46

## 2018-09-28 RX ADMIN — PREDNISONE 40 MG: 20 TABLET ORAL at 09:11

## 2018-09-28 RX ADMIN — PANTOPRAZOLE SODIUM 40 MG: 40 TABLET, DELAYED RELEASE ORAL at 06:32

## 2018-09-28 RX ADMIN — LEVOFLOXACIN 500 MG: 500 TABLET, FILM COATED ORAL at 09:11

## 2018-09-28 ASSESSMENT — ACTIVITIES OF DAILY LIVING (ADL)
ADLS_ACUITY_SCORE: 12
ADLS_ACUITY_SCORE: 11

## 2018-09-28 NOTE — DISCHARGE INSTRUCTIONS
You are being discharged to a transitional care unit:  [facility] [place]  Phone: [phone]

## 2018-09-28 NOTE — PLAN OF CARE
Outcome: Improving  A/Ox4. VSS on 4L O2 NC. Dyspneic on exertion. Denies pain. Pt to discharge to [facility] TCU. Transport provided by daughter. Discharge reviewed with pt and daughter. Questions answered. Pt sent with belongings and discharge packet.

## 2018-09-28 NOTE — PLAN OF CARE
Problem: Patient Care Overview  Goal: Plan of Care/Patient Progress Review  Alert and oriented x4. Vital signs stable on 4L nasal cannula. Assist of 1. Tolerating regular diet. Lung sounds diminished, crackles on right. Bowel sounds active, + flatus. Voiding adequately. Feet dusky. Left jaw pain - declined interventions. Denies nausea.   Physical Therapy Discharge Summary    Reason for therapy discharge:    Discharged to transitional care facility.    Progress towards therapy goal(s). See goals on Care Plan in [facility] electronic health record for goal details.  Goals not met.  Barriers to achieving goals:   discharge from facility.    Therapy recommendation(s):    Continued therapy is recommended.  Rationale/Recommendations:  Patient would benefit from continued skilled PT to progress her functional independence as well as endurance. .

## 2018-09-28 NOTE — PROGRESS NOTES
A/O, VSS on 4L O2 per NC. LS dim, pt dyspneic on exertion. Denies pain. Plan for discontinue to TCU today.

## 2018-09-28 NOTE — DISCHARGE SUMMARY
[facility]    Discharge Summary  Hospitalist    Date of Admission:  9/25/2018  Date of Discharge:  9/28/2018  Discharging Provider: Art Hoover MD    Discharge Diagnoses      Chronic obstructive pulmonary disease acute exacerbation  Probable pneumonia  Atrial fibrillation on chronic Coumadin  Hypertension  Chronic kidney disease stage III  History of peptic ulcer disease     Hospital Course   88-year-old female with past medical history of COPD on chronic oxygen, lung cancer status post radiation and lobectomy, mitral valve replacement, atrial fibrillation with pacemaker on chronic Coumadin, pulmonary hypertension and tricuspid regurgitation who presents with worsening dyspnea, suspected consistent with chronic obstructive pulmonary disease exacerbation.       Chronic obstructive pulmonary disease acute exacerbation  Probable pneumonia  -Patient on home oxygen with baseline of 4 L, presented with worsening shortness of breath, was found to be in COPD acute exacerbation, initially required BiPAP.    -Chest x-ray with notable infiltrates in the right lung which is in the area where she had prior carcinoma.  -Weaned off BiPAP and was back on 4 L via nasal cannula which is her baseline.  -Continue prior to admission inhalers  -Initially was placed on IV steroids, transition to prednisone, plan 2-week taper at discharge.  -Repeat chest x-ray in 1 month as outpatient with primary care provider.  -Continue Levaquin to complete 7-day course  -Discharge to TCU per PT recommendation.    Atrial fibrillation on chronic Coumadin:  -Continue prior to admission metoprolol and Coumadin.  -recheck  INR tomorrow morning    Hypertension:    -Resume prior to admission metoprolol     Chronic kidney disease stage III  -Baseline creatinine appears to be around 1.1-1.3  -At baseline     History of peptic ulcer disease:  -Continue prior to admission PPI     Left neck pain:  -She endorsed pain in left upper neck over  her left parotid gland since this morning, mildly tender to palpation  -Probably had mild sialoadenitis, ready on Levaquin for COPD/pneumonia  -Much improved in the last 24-48 hours  -No obvious parotid gland swelling      # Discharge Pain Plan:   - Patient currently has NO PAIN and is not being prescribed pain medications on discharge.      Art Hoover MD    Significant Results and Procedures   See below    Pending Results     Unresulted Labs Ordered in the Past 30 Days of this Admission     Date and Time Order Name Status Description    9/25/2018 0716 Blood culture Preliminary     9/25/2018 0716 Blood culture Preliminary           Code Status   DNR / DNI       Primary Care Physician   Raj García    Physical Exam   Temp: 97.9  F (36.6  C) Temp src: Oral BP: 146/69 Pulse: 67 Heart Rate: 80 Resp: 16 SpO2: 97 % O2 Device: Nasal cannula Oxygen Delivery: 4 LPM    Constitutional: AAOX3, NAD  Neck-no parotid swelling, no tenderness today on the left upper neck.  Respiratory: Diminished at the bases, no active wheezes, normal effort of breathing.  Cardiovascular: RRR, No murmur  GI: Soft, Non- tender, BS- normoactive  Neuro: Cranial nerves intact, moving all 4 extremities appropriately.    Discharge Disposition   Discharged to short-term care facility  Condition at discharge: Stable    Consultations This Hospital Stay   CARE COORDINATOR IP CONSULT  RESPIRATORY CARE IP CONSULT  PHARMACY TO DOSE WARFARIN  PHYSICAL THERAPY ADULT IP CONSULT  CARE TRANSITION RN/SW IP CONSULT  PHYSICAL THERAPY ADULT IP CONSULT  OCCUPATIONAL THERAPY ADULT IP CONSULT    Time Spent on this Encounter   Art DRUMMOND, personally saw the patient today and spent greater than 30 minutes discharging this patient.    Discharge Orders     General info for SNF   Length of Stay Estimate: Short Term Care: Estimated # of Days <30  Condition at Discharge: Improving  Level of care:skilled   Rehabilitation Potential: Good  Admission H&P remains  valid and up-to-date: Yes  Recent Chemotherapy: N/A  Use Nursing Home Standing Orders: N/A     Mantoux instructions   Give two-step Mantoux (PPD) Per Facility Policy Yes     Follow Up and recommended labs and tests   Follow up with long-term physician.  The following labs/tests are recommended: INR on 9/29. CBC and BMP in 1 week     Activity - Up with nursing assistance     DNR/DNI     Physical Therapy Adult Consult   Evaluate and treat as clinically indicated.    Reason:     Occupational Therapy Adult Consult   Evaluate and treat as clinically indicated.    Reason:     Oxygen - Nasal cannula   4 Lpm by nasal cannula to keep O2 sats 92% or greater.     Fall precautions     Advance Diet as Tolerated   Follow this diet upon discharge: Orders Placed This Encounter     Regular Diet Adult         Discharge Medications   Current Discharge Medication List      START taking these medications    Details   levofloxacin (LEVAQUIN) 500 MG tablet Take 1 tablet (500 mg) by mouth daily  Qty: 4 tablet, Refills: 0    Associated Diagnoses: COPD exacerbation (H)      predniSONE (DELTASONE) 10 MG tablet 4 tabs daily for 3 days, then 3 tabs daily for 3 days, then 2 tabs daily for 3 days, then 1 tab daily for 3 days, then stop  Qty: 30 tablet, Refills: 0    Associated Diagnoses: COPD exacerbation (H)         CONTINUE these medications which have CHANGED    Details   !! warfarin (COUMADIN) 2 MG tablet Take 1 tablet (2 mg) by mouth once for 1 dose  Qty: 30 tablet    Associated Diagnoses: Atrial fibrillation, unspecified type (H)      !! warfarin (COUMADIN) 4 MG tablet Take 1 tablet (4 mg) by mouth daily  Qty: 30 tablet    Associated Diagnoses: H/O mitral valve repair       !! - Potential duplicate medications found. Please discuss with provider.      CONTINUE these medications which have NOT CHANGED    Details   fluticasone-vilanterol (BREO ELLIPTA) 100-25 MCG/INH inhaler Inhale 1 puff into the lungs daily      furosemide (LASIX) 40 MG  tablet Take 80 mg by mouth      fluticasone-salmeterol (ADVAIR) 250-50 MCG/DOSE diskus inhaler Inhale 1 puff into the lungs every 12 hours      METOPROLOL TARTRATE PO Take 100 mg by mouth daily       pantoprazole (PROTONIX) 40 MG enteric coated tablet Take 1 tablet (40 mg) by mouth 2 times daily (before meals)  Qty: 60 tablet, Refills: 3    Comments: BID x 4 weeks, then daily indefinitely **new time period  Associated Diagnoses: Gastric ulcer      potassium chloride (MICRO-K) 10 MEQ CR capsule Take 20 mEq by mouth daily       umeclidinium (INCRUSE ELLIPTA) 62.5 MCG/INH inhaler Inhale 1 puff into the lungs daily      VITAMIN D, CHOLECALCIFEROL, PO Take 2,000 Units by mouth daily           Allergies   Allergies   Allergen Reactions     Codeine Sulfate Nausea     Data   Most Recent 3 CBC's:  Recent Labs   Lab Test  09/27/18   0547  09/26/18   0550  09/25/18   0451   WBC  9.4  10.4  8.7   HGB  9.9*  9.8*  11.7   MCV  97  97  100   PLT  118*  116*  155      Most Recent 3 BMP's:  Recent Labs   Lab Test  09/28/18   0806  09/27/18   0547  09/26/18   0550  09/25/18   0451   NA   --   142  146*  144   POTASSIUM   --   4.3  3.6  4.0   CHLORIDE   --   103  105  103   CO2   --   33*  35*  37*   BUN  54*  52*  46*  30   CR  1.38*  1.42*  1.61*  1.38*   ANIONGAP   --   6  6  4   JENNIE   --   9.0  8.8  9.1   GLC   --   168*  119*  178*     Most Recent 2 LFT's:  Recent Labs   Lab Test  11/15/17   0133  05/03/14   0744   AST  24  61*   ALT  15  68*   ALKPHOS  72  83   BILITOTAL  0.6  1.0     Most Recent INR's and Anticoagulation Dosing History:  Anticoagulation Dose History     Recent Dosing and Labs Latest Ref Rng & Units 11/18/2017 11/19/2017 11/20/2017 9/25/2018 9/26/2018 9/27/2018 9/28/2018    Warfarin 2 mg - - 4 mg - - - - -    Warfarin 2.5 mg - 2.5 mg - - - - - -    Warfarin 5 mg - - - 5 mg - - - -    INR 0.86 - 1.14 2.37(H) 2.13(H) 1.93(H) 3.26(H) 4.59(H) 4.10(H) 2.52(H)        Most Recent 3 Troponin's:  Recent Labs   Lab Test   09/25/18   0451  11/20/17   1515  11/20/17   0005   TROPI  <0.015  0.050*  0.038     Most Recent Cholesterol Panel:No lab results found.  Most Recent 6 Bacteria Isolates From Any Culture (See EPIC Reports for Culture Details):  Recent Labs   Lab Test  09/25/18   1347  09/25/18   1340  05/03/14   0935  05/03/14   0744  01/12/11   1430  01/12/11   1345   CULT  No growth after 3 days  No growth after 3 days  No growth  No growth  No growth after 6 days  No growth after 6 days     Most Recent TSH, T4 and A1c Labs:No lab results found.    Results for orders placed or performed during the hospital encounter of 09/25/18   XR Chest Port 1 View    Narrative    CHEST SINGLE VIEW PORTABLE  9/25/2018 4:54 AM     HISTORY: Shortness of breath.    COMPARISON: 11/15/2017.    FINDINGS: A moderate-sized region of patchy opacities in the mid and  inferior right lung. A band-like opacity in the mid right lung is  again noted and likely represents scarring. The left lung is  relatively clear. Moderate to marked enlargement of the  cardiopericardial silhouette again noted. Atherosclerotic  calcification in the thoracic aorta. Prior median sternotomy. Left  anterior chest wall cardiac device with lead tips in the right atrium  and right ventricle.      Impression    IMPRESSION: A moderate-sized region of patchy opacities in the mid and  inferior right lung is nonspecific, but could be infectious or  inflammatory in etiology. A follow-up chest radiograph would be useful  in one month for reevaluation.    RAYSHAWN CASTANEDA MD

## 2018-09-28 NOTE — PROGRESS NOTES
SW:    I: SW following for discharge planning. Pt is medically appropriate for discharge today to [place]. SW updated admissions (Dana) who plans to print off orders from [facility], made aware of discharge time. HOMERO also contacted pt daughter, Lisa, to confirm discharge today and she plans to come up to pt room at 1400. SW confirmed with pt yesterday that she has her own portable oxygen tank for transport.    P: Pt to discharge to [place] [facility] via family transport at 1400.    PAS-RR    D: Per DHS regulation, HOMERO completed and submitted PAS-RR to [place] [employer] via the Senior LinkAge Line.  PAS-RR confirmation # is : [id number]      I: SW spoke with pt and they are aware a PAS-RR has been submitted.  HOMERO reviewed with pt that they may be contacted for a follow up appointment within 10 days of hospital discharge if their SNF stay is < 30 days.  Contact information for [facility] LinkAge Line was also provided.    A: pt verbalized understanding.    P: Further questions may be directed to [facility] LinkAge Line at #[phone], option #4 for PAS-RR staff.      MEGHANN Hayden, [facility]  Daytime (8:00am-4:30pm): [phone]  After-Hours SW Pager (4:30pm-11:30pm): [phone]

## 2018-10-01 LAB
BACTERIA SPEC CULT: NO GROWTH
BACTERIA SPEC CULT: NO GROWTH
Lab: NORMAL
Lab: NORMAL
SPECIMEN SOURCE: NORMAL
SPECIMEN SOURCE: NORMAL

## 2019-10-25 NOTE — PHARMACY-ANTICOAGULATION SERVICE
Clinical Pharmacy - Warfarin Dosing Consult     Pharmacy has been consulted to manage this patient s warfarin therapy.  Indication: Atrial Fibrillation  Therapy Goal: INR 2-3  Provider/Team: Dr Horacio Ashton  Warfarin Prior to Admission: Yes  Warfarin PTA Regimen: 4mg daily  Significant drug interactions: Azithromycin  Recent documented change in oral intake/nutrition: No    INR   Date Value Ref Range Status   05/05/2014 3.40 (H) 0.86 - 1.14 Final   05/04/2014 3.93 (H) 0.86 - 1.14 Final       Will dose warfarin when INR obtained.  Pharmacy will monitor Neda Dennis daily and order warfarin doses to achieve specified goal.      Please contact pharmacy as soon as possible if the warfarin needs to be held for a procedure or if the warfarin goals change.      
Right arm;

## 2020-08-14 NOTE — PROVIDER NOTIFICATION
Paged Dr. Hoover 09/26/18 10:20 AM regarding creatinine 1.38 --> 1.61, lasix to be restarted today. Orders received: discontinue lasix and K for today, repeat labs in AM.  
No